# Patient Record
Sex: FEMALE | Race: BLACK OR AFRICAN AMERICAN | NOT HISPANIC OR LATINO | Employment: FULL TIME | URBAN - METROPOLITAN AREA
[De-identification: names, ages, dates, MRNs, and addresses within clinical notes are randomized per-mention and may not be internally consistent; named-entity substitution may affect disease eponyms.]

---

## 2023-03-07 LAB
ALANINE AMINOTRANSFERASE (SGPT) (U/L) IN SER/PLAS: 28 U/L (ref 7–45)
ALBUMIN (G/DL) IN SER/PLAS: 4.1 G/DL (ref 3.4–5)
ALBUMIN (MG/L) IN URINE: <7 MG/L
ALBUMIN/CREATININE (UG/MG) IN URINE: NORMAL UG/MG CRT (ref 0–30)
ALKALINE PHOSPHATASE (U/L) IN SER/PLAS: 73 U/L (ref 33–110)
ANION GAP IN SER/PLAS: 11 MMOL/L (ref 10–20)
ASPARTATE AMINOTRANSFERASE (SGOT) (U/L) IN SER/PLAS: 24 U/L (ref 9–39)
BILIRUBIN TOTAL (MG/DL) IN SER/PLAS: 0.4 MG/DL (ref 0–1.2)
CALCIDIOL (25 OH VITAMIN D3) (NG/ML) IN SER/PLAS: 33 NG/ML
CALCIUM (MG/DL) IN SER/PLAS: 9.7 MG/DL (ref 8.6–10.6)
CARBON DIOXIDE, TOTAL (MMOL/L) IN SER/PLAS: 30 MMOL/L (ref 21–32)
CHLORIDE (MMOL/L) IN SER/PLAS: 103 MMOL/L (ref 98–107)
CHOLESTEROL (MG/DL) IN SER/PLAS: 156 MG/DL (ref 0–199)
CHOLESTEROL IN HDL (MG/DL) IN SER/PLAS: 39.1 MG/DL
CHOLESTEROL/HDL RATIO: 4
CREATININE (MG/DL) IN SER/PLAS: 0.85 MG/DL (ref 0.5–1.05)
CREATININE (MG/DL) IN URINE: 126 MG/DL (ref 20–320)
CREATININE (MG/DL) IN URINE: 126 MG/DL (ref 20–320)
ERYTHROCYTE DISTRIBUTION WIDTH (RATIO) BY AUTOMATED COUNT: 16 % (ref 11.5–14.5)
ERYTHROCYTE MEAN CORPUSCULAR HEMOGLOBIN CONCENTRATION (G/DL) BY AUTOMATED: 31 G/DL (ref 32–36)
ERYTHROCYTE MEAN CORPUSCULAR VOLUME (FL) BY AUTOMATED COUNT: 79 FL (ref 80–100)
ERYTHROCYTES (10*6/UL) IN BLOOD BY AUTOMATED COUNT: 4.8 X10E12/L (ref 4–5.2)
ESTIMATED AVERAGE GLUCOSE FOR HBA1C: 180 MG/DL
GFR FEMALE: 84 ML/MIN/1.73M2
GLUCOSE (MG/DL) IN SER/PLAS: 153 MG/DL (ref 74–99)
HEMATOCRIT (%) IN BLOOD BY AUTOMATED COUNT: 38.1 % (ref 36–46)
HEMOGLOBIN (G/DL) IN BLOOD: 11.8 G/DL (ref 12–16)
HEMOGLOBIN A1C/HEMOGLOBIN TOTAL IN BLOOD: 7.9 %
IRON (UG/DL) IN SER/PLAS: 65 UG/DL (ref 35–150)
IRON BINDING CAPACITY (UG/DL) IN SER/PLAS: 326 UG/DL (ref 240–445)
IRON SATURATION (%) IN SER/PLAS: 20 % (ref 25–45)
LDL: 97 MG/DL (ref 0–99)
LEUKOCYTES (10*3/UL) IN BLOOD BY AUTOMATED COUNT: 12.3 X10E9/L (ref 4.4–11.3)
NRBC (PER 100 WBCS) BY AUTOMATED COUNT: 0 /100 WBC (ref 0–0)
PLATELETS (10*3/UL) IN BLOOD AUTOMATED COUNT: 424 X10E9/L (ref 150–450)
POTASSIUM (MMOL/L) IN SER/PLAS: 4.6 MMOL/L (ref 3.5–5.3)
PROTEIN (MG/DL) IN URINE: 7 MG/DL (ref 5–24)
PROTEIN TOTAL: 6.6 G/DL (ref 6.4–8.2)
PROTEIN/CREATININE (MG/MG) IN URINE: 0.06 MG/MG CREAT (ref 0–0.17)
SODIUM (MMOL/L) IN SER/PLAS: 139 MMOL/L (ref 136–145)
THYROTROPIN (MIU/L) IN SER/PLAS BY DETECTION LIMIT <= 0.05 MIU/L: 1.44 MIU/L (ref 0.44–3.98)
TRIGLYCERIDE (MG/DL) IN SER/PLAS: 99 MG/DL (ref 0–149)
UREA NITROGEN (MG/DL) IN SER/PLAS: 12 MG/DL (ref 6–23)
VLDL: 20 MG/DL (ref 0–40)

## 2023-03-16 DIAGNOSIS — Z00.00 HEALTHCARE MAINTENANCE: Primary | ICD-10-CM

## 2023-03-16 LAB
MUMPS IGG ANTIBODY: NEGATIVE
RUBEOLA IGG ANTIBODY: NEGATIVE
VARICELLA ZOSTER IGG: POSITIVE

## 2023-03-26 DIAGNOSIS — E11.9 TYPE 2 DIABETES MELLITUS WITHOUT COMPLICATIONS (MULTI): ICD-10-CM

## 2023-03-28 RX ORDER — LANCETS 30 GAUGE
EACH MISCELLANEOUS
Qty: 1 EACH | Refills: 0 | Status: SHIPPED | OUTPATIENT
Start: 2023-03-28

## 2023-05-28 DIAGNOSIS — T78.40XD ALLERGY, SUBSEQUENT ENCOUNTER: ICD-10-CM

## 2023-05-28 DIAGNOSIS — F32.9 MAJOR DEPRESSIVE DISORDER WITH CURRENT ACTIVE EPISODE, UNSPECIFIED DEPRESSION EPISODE SEVERITY, UNSPECIFIED WHETHER RECURRENT: ICD-10-CM

## 2023-05-30 RX ORDER — FLUTICASONE PROPIONATE 50 MCG
2 SPRAY, SUSPENSION (ML) NASAL DAILY
Qty: 16 G | Refills: 2 | Status: SHIPPED | OUTPATIENT
Start: 2023-05-30 | End: 2023-09-05

## 2023-05-30 RX ORDER — FLUTICASONE PROPIONATE 50 MCG
2 SPRAY, SUSPENSION (ML) NASAL DAILY
COMMUNITY
Start: 2020-06-30 | End: 2023-05-30 | Stop reason: SDUPTHER

## 2023-05-30 RX ORDER — CITALOPRAM 20 MG/1
TABLET, FILM COATED ORAL
Qty: 90 TABLET | Refills: 2 | Status: SHIPPED | OUTPATIENT
Start: 2023-05-30 | End: 2023-07-29

## 2023-05-30 NOTE — TELEPHONE ENCOUNTER
Rx Refill Request Telephone Encounter    Name:  Edithalison JOSE JUAN Terrell  :  [id number]  Medication Name:  Flonase 50mg   Specific Pharmacy location:  [facility]

## 2023-07-19 ENCOUNTER — HOSPITAL ENCOUNTER (OUTPATIENT)
Dept: DATA CONVERSION | Facility: HOSPITAL | Age: 48
End: 2023-07-19
Attending: STUDENT IN AN ORGANIZED HEALTH CARE EDUCATION/TRAINING PROGRAM | Admitting: STUDENT IN AN ORGANIZED HEALTH CARE EDUCATION/TRAINING PROGRAM
Payer: MEDICARE

## 2023-07-19 DIAGNOSIS — Z12.11 ENCOUNTER FOR SCREENING FOR MALIGNANT NEOPLASM OF COLON: ICD-10-CM

## 2023-07-19 DIAGNOSIS — K57.30 DIVERTICULOSIS OF LARGE INTESTINE WITHOUT PERFORATION OR ABSCESS WITHOUT BLEEDING: ICD-10-CM

## 2023-07-19 DIAGNOSIS — D12.3 BENIGN NEOPLASM OF TRANSVERSE COLON: ICD-10-CM

## 2023-07-19 LAB — POCT GLUCOSE: 103 MG/DL (ref 74–99)

## 2023-07-26 LAB
COMPLETE PATHOLOGY REPORT: NORMAL
CONVERTED CLINICAL DIAGNOSIS-HISTORY: NORMAL
CONVERTED FINAL DIAGNOSIS: NORMAL
CONVERTED FINAL REPORT PDF LINK TO COPY AND PASTE: NORMAL
CONVERTED GROSS DESCRIPTION: NORMAL

## 2023-09-05 DIAGNOSIS — T78.40XD ALLERGY, SUBSEQUENT ENCOUNTER: ICD-10-CM

## 2023-09-05 RX ORDER — FLUTICASONE PROPIONATE 50 MCG
2 SPRAY, SUSPENSION (ML) NASAL DAILY
Qty: 16 ML | Refills: 2 | Status: SHIPPED | OUTPATIENT
Start: 2023-09-05 | End: 2023-10-05

## 2023-09-26 DIAGNOSIS — C50.411 MALIGNANT NEOPLASM OF UPPER-OUTER QUADRANT OF RIGHT FEMALE BREAST, UNSPECIFIED ESTROGEN RECEPTOR STATUS (MULTI): Primary | ICD-10-CM

## 2023-09-26 RX ORDER — LIDOCAINE HYDROCHLORIDE 10 MG/ML
2 INJECTION, SOLUTION EPIDURAL; INFILTRATION; INTRACAUDAL; PERINEURAL ONCE
Status: CANCELLED | OUTPATIENT
Start: 2023-10-03

## 2023-09-26 RX ORDER — HEPARIN SODIUM,PORCINE/PF 10 UNIT/ML
50 SYRINGE (ML) INTRAVENOUS AS NEEDED
Status: CANCELLED | OUTPATIENT
Start: 2023-09-30

## 2023-09-26 RX ORDER — FAMOTIDINE 10 MG/ML
20 INJECTION INTRAVENOUS ONCE AS NEEDED
Status: CANCELLED | OUTPATIENT
Start: 2023-10-03

## 2023-09-26 RX ORDER — EPINEPHRINE 0.3 MG/.3ML
0.3 INJECTION SUBCUTANEOUS EVERY 5 MIN PRN
Status: CANCELLED | OUTPATIENT
Start: 2023-10-03

## 2023-09-26 RX ORDER — ALBUTEROL SULFATE 0.83 MG/ML
3 SOLUTION RESPIRATORY (INHALATION) AS NEEDED
Status: CANCELLED | OUTPATIENT
Start: 2023-10-03

## 2023-09-26 RX ORDER — DIPHENHYDRAMINE HYDROCHLORIDE 50 MG/ML
50 INJECTION INTRAMUSCULAR; INTRAVENOUS AS NEEDED
Status: CANCELLED | OUTPATIENT
Start: 2023-10-03

## 2023-09-26 RX ORDER — HEPARIN 100 UNIT/ML
500 SYRINGE INTRAVENOUS AS NEEDED
Status: CANCELLED | OUTPATIENT
Start: 2023-09-30

## 2023-09-28 PROBLEM — D05.11 DUCTAL CARCINOMA IN SITU (DCIS) OF RIGHT BREAST: Status: ACTIVE | Noted: 2023-09-28

## 2023-09-28 PROBLEM — R73.9 HYPERGLYCEMIA: Status: ACTIVE | Noted: 2023-09-28

## 2023-09-28 PROBLEM — N12 PYELONEPHRITIS: Status: ACTIVE | Noted: 2023-09-28

## 2023-09-28 PROBLEM — J30.2 SEASONAL ALLERGIES: Status: ACTIVE | Noted: 2023-09-28

## 2023-09-28 PROBLEM — R31.9 HEMATURIA: Status: ACTIVE | Noted: 2023-09-28

## 2023-09-28 PROBLEM — D22.5 MELANOCYTIC NEVI OF TRUNK: Status: ACTIVE | Noted: 2023-04-25

## 2023-09-28 PROBLEM — R35.0 FREQUENCY OF URINATION: Status: ACTIVE | Noted: 2023-09-28

## 2023-09-28 PROBLEM — E11.65 UNCONTROLLED TYPE 2 DIABETES MELLITUS WITH HYPERGLYCEMIA, WITHOUT LONG-TERM CURRENT USE OF INSULIN (MULTI): Status: ACTIVE | Noted: 2023-09-28

## 2023-09-28 PROBLEM — D22.70 MELANOCYTIC NEVI OF UNSPECIFIED LOWER LIMB, INCLUDING HIP: Status: ACTIVE | Noted: 2023-04-25

## 2023-09-28 PROBLEM — M72.2 PLANTAR FASCIITIS, LEFT: Status: ACTIVE | Noted: 2023-09-28

## 2023-09-28 PROBLEM — D22.39 MELANOCYTIC NEVI OF OTHER PARTS OF FACE: Status: ACTIVE | Noted: 2023-04-25

## 2023-09-28 PROBLEM — I10 HYPERTENSION: Status: ACTIVE | Noted: 2023-09-28

## 2023-09-28 PROBLEM — E66.01 OBESITY, CLASS III, BMI 40-49.9 (MORBID OBESITY) (MULTI): Status: ACTIVE | Noted: 2023-09-28

## 2023-09-28 PROBLEM — L85.3 XEROSIS OF SKIN: Status: ACTIVE | Noted: 2023-04-25

## 2023-09-28 PROBLEM — E66.813 OBESITY, CLASS III, BMI 40-49.9 (MORBID OBESITY): Status: ACTIVE | Noted: 2023-09-28

## 2023-09-28 PROBLEM — L57.9 SKIN CHANGES DUE TO CHRONIC EXPOSURE TO NONIONIZING RADIATION, UNSPECIFIED: Status: ACTIVE | Noted: 2023-04-25

## 2023-09-28 PROBLEM — R92.1 BREAST CALCIFICATIONS ON MAMMOGRAM: Status: ACTIVE | Noted: 2023-09-28

## 2023-09-28 PROBLEM — R60.0 PEDAL EDEMA: Status: ACTIVE | Noted: 2023-09-28

## 2023-09-28 PROBLEM — F41.8 DEPRESSION WITH ANXIETY: Status: ACTIVE | Noted: 2023-09-28

## 2023-09-28 PROBLEM — N90.89 VULVAR LESION: Status: ACTIVE | Noted: 2023-09-28

## 2023-09-28 PROBLEM — D22.4 MELANOCYTIC NEVI OF SCALP AND NECK: Status: ACTIVE | Noted: 2023-04-25

## 2023-09-28 PROBLEM — B35.1 ONYCHOMYCOSIS: Status: ACTIVE | Noted: 2023-09-28

## 2023-09-28 PROBLEM — E78.5 HLD (HYPERLIPIDEMIA): Status: ACTIVE | Noted: 2023-09-28

## 2023-09-28 PROBLEM — D22.60 MELANOCYTIC NEVI OF UNSPECIFIED UPPER LIMB, INCLUDING SHOULDER: Status: ACTIVE | Noted: 2023-04-25

## 2023-09-28 RX ORDER — ANASTROZOLE 1 MG/1
1 TABLET ORAL DAILY
COMMUNITY
End: 2024-02-26 | Stop reason: WASHOUT

## 2023-09-28 RX ORDER — PNV NO.95/FERROUS FUM/FOLIC AC 28MG-0.8MG
100 TABLET ORAL DAILY
COMMUNITY
End: 2024-02-26 | Stop reason: WASHOUT

## 2023-09-28 RX ORDER — ASCORBIC ACID 500 MG
500 TABLET ORAL DAILY
COMMUNITY
End: 2024-02-26 | Stop reason: WASHOUT

## 2023-09-28 RX ORDER — OFLOXACIN 3 MG/ML
5 SOLUTION AURICULAR (OTIC) 2 TIMES DAILY
COMMUNITY
Start: 2020-10-01

## 2023-09-28 RX ORDER — BLOOD-GLUCOSE SENSOR
EACH MISCELLANEOUS
COMMUNITY
Start: 2023-06-14

## 2023-09-28 RX ORDER — BIOTIN 1 MG
1 TABLET ORAL 2 TIMES DAILY
COMMUNITY
End: 2024-02-26 | Stop reason: WASHOUT

## 2023-09-28 RX ORDER — OXYCODONE HYDROCHLORIDE 5 MG/1
TABLET ORAL
COMMUNITY

## 2023-09-28 RX ORDER — METFORMIN HYDROCHLORIDE 500 MG/1
500 TABLET ORAL 2 TIMES DAILY
COMMUNITY
Start: 2020-10-01 | End: 2024-02-26 | Stop reason: WASHOUT

## 2023-09-28 RX ORDER — NAPROXEN 500 MG/1
500 TABLET ORAL 2 TIMES DAILY PRN
COMMUNITY
Start: 2021-12-08

## 2023-09-28 RX ORDER — KETOCONAZOLE 20 MG/ML
SHAMPOO, SUSPENSION TOPICAL
COMMUNITY
Start: 2023-04-25 | End: 2024-02-26 | Stop reason: WASHOUT

## 2023-09-28 RX ORDER — DICLOFENAC SODIUM 10 MG/G
GEL TOPICAL
COMMUNITY
Start: 2022-12-16

## 2023-09-28 RX ORDER — ZOLEDRONIC ACID 4 MG
KIT INTRAVENOUS
COMMUNITY
End: 2024-02-26 | Stop reason: WASHOUT

## 2023-09-28 RX ORDER — GABAPENTIN 300 MG/1
300 CAPSULE ORAL 3 TIMES DAILY
COMMUNITY
Start: 2022-08-10

## 2023-09-28 RX ORDER — HYDROCHLOROTHIAZIDE 25 MG/1
1 TABLET ORAL DAILY
COMMUNITY
Start: 2022-01-25

## 2023-09-28 RX ORDER — DULAGLUTIDE 1.5 MG/.5ML
INJECTION, SOLUTION SUBCUTANEOUS
COMMUNITY
Start: 2023-05-15 | End: 2024-02-26 | Stop reason: WASHOUT

## 2023-09-28 RX ORDER — ALBUTEROL SULFATE 90 UG/1
1-2 AEROSOL, METERED RESPIRATORY (INHALATION)
COMMUNITY
Start: 2022-10-03

## 2023-09-28 RX ORDER — NAPROXEN SODIUM 375 MG/1
2 TABLET, FILM COATED, EXTENDED RELEASE ORAL DAILY
COMMUNITY
Start: 2023-01-19

## 2023-10-03 ENCOUNTER — INFUSION (OUTPATIENT)
Dept: HEMATOLOGY/ONCOLOGY | Facility: CLINIC | Age: 48
End: 2023-10-03

## 2023-10-03 VITALS
BODY MASS INDEX: 42.87 KG/M2 | SYSTOLIC BLOOD PRESSURE: 133 MMHG | OXYGEN SATURATION: 100 % | HEART RATE: 76 BPM | DIASTOLIC BLOOD PRESSURE: 80 MMHG | TEMPERATURE: 97.3 F | RESPIRATION RATE: 18 BRPM | WEIGHT: 281.97 LBS

## 2023-10-03 DIAGNOSIS — C50.411 MALIGNANT NEOPLASM OF UPPER-OUTER QUADRANT OF RIGHT FEMALE BREAST, UNSPECIFIED ESTROGEN RECEPTOR STATUS (MULTI): ICD-10-CM

## 2023-10-03 PROCEDURE — 96402 CHEMO HORMON ANTINEOPL SQ/IM: CPT

## 2023-10-03 PROCEDURE — 2500000005 HC RX 250 GENERAL PHARMACY W/O HCPCS: Performed by: NURSE PRACTITIONER

## 2023-10-03 PROCEDURE — 2500000004 HC RX 250 GENERAL PHARMACY W/ HCPCS (ALT 636 FOR OP/ED): Mod: JZ | Performed by: NURSE PRACTITIONER

## 2023-10-03 RX ORDER — EPINEPHRINE 0.3 MG/.3ML
0.3 INJECTION SUBCUTANEOUS EVERY 5 MIN PRN
Status: DISCONTINUED | OUTPATIENT
Start: 2023-10-03 | End: 2023-10-03 | Stop reason: HOSPADM

## 2023-10-03 RX ORDER — ALBUTEROL SULFATE 0.83 MG/ML
3 SOLUTION RESPIRATORY (INHALATION) AS NEEDED
Status: CANCELLED | OUTPATIENT
Start: 2023-10-31

## 2023-10-03 RX ORDER — LIDOCAINE HYDROCHLORIDE 10 MG/ML
2 INJECTION, SOLUTION EPIDURAL; INFILTRATION; INTRACAUDAL; PERINEURAL ONCE
Status: CANCELLED | OUTPATIENT
Start: 2023-10-31

## 2023-10-03 RX ORDER — DIPHENHYDRAMINE HYDROCHLORIDE 50 MG/ML
50 INJECTION INTRAMUSCULAR; INTRAVENOUS AS NEEDED
Status: CANCELLED | OUTPATIENT
Start: 2023-10-31

## 2023-10-03 RX ORDER — LIDOCAINE HYDROCHLORIDE 10 MG/ML
2 INJECTION, SOLUTION EPIDURAL; INFILTRATION; INTRACAUDAL; PERINEURAL ONCE
Status: COMPLETED | OUTPATIENT
Start: 2023-10-03 | End: 2023-10-03

## 2023-10-03 RX ORDER — FAMOTIDINE 10 MG/ML
20 INJECTION INTRAVENOUS ONCE AS NEEDED
Status: DISCONTINUED | OUTPATIENT
Start: 2023-10-03 | End: 2023-10-03 | Stop reason: HOSPADM

## 2023-10-03 RX ORDER — ALBUTEROL SULFATE 0.83 MG/ML
3 SOLUTION RESPIRATORY (INHALATION) AS NEEDED
Status: DISCONTINUED | OUTPATIENT
Start: 2023-10-03 | End: 2023-10-03 | Stop reason: HOSPADM

## 2023-10-03 RX ORDER — FAMOTIDINE 10 MG/ML
20 INJECTION INTRAVENOUS ONCE AS NEEDED
Status: CANCELLED | OUTPATIENT
Start: 2023-10-31

## 2023-10-03 RX ORDER — EPINEPHRINE 0.3 MG/.3ML
0.3 INJECTION SUBCUTANEOUS EVERY 5 MIN PRN
Status: CANCELLED | OUTPATIENT
Start: 2023-10-31

## 2023-10-03 RX ORDER — DIPHENHYDRAMINE HYDROCHLORIDE 50 MG/ML
50 INJECTION INTRAMUSCULAR; INTRAVENOUS AS NEEDED
Status: DISCONTINUED | OUTPATIENT
Start: 2023-10-03 | End: 2023-10-03 | Stop reason: HOSPADM

## 2023-10-03 RX ADMIN — GOSERELIN ACETATE 3.6 MG: 3.6 IMPLANT SUBCUTANEOUS at 09:34

## 2023-10-03 RX ADMIN — LIDOCAINE HYDROCHLORIDE 20 MG: 10 INJECTION, SOLUTION EPIDURAL; INFILTRATION; INTRACAUDAL; PERINEURAL at 09:29

## 2023-10-03 ASSESSMENT — PAIN SCALES - GENERAL: PAINLEVEL: 0-NO PAIN

## 2023-10-03 ASSESSMENT — ENCOUNTER SYMPTOMS
LOSS OF SENSATION IN FEET: 0
DEPRESSION: 0
OCCASIONAL FEELINGS OF UNSTEADINESS: 0

## 2023-10-03 NOTE — PROGRESS NOTES
Patient here for zoladex injection administered after dwell of lidocain injection in left abdomen - patient tolerated well. Bandaid applied

## 2023-10-31 ENCOUNTER — TELEPHONE (OUTPATIENT)
Dept: HEMATOLOGY/ONCOLOGY | Facility: CLINIC | Age: 48
End: 2023-10-31

## 2023-11-03 ENCOUNTER — INFUSION (OUTPATIENT)
Dept: HEMATOLOGY/ONCOLOGY | Facility: CLINIC | Age: 48
End: 2023-11-03
Payer: MEDICARE

## 2023-11-03 VITALS
OXYGEN SATURATION: 98 % | TEMPERATURE: 98.1 F | RESPIRATION RATE: 18 BRPM | WEIGHT: 282.6 LBS | DIASTOLIC BLOOD PRESSURE: 87 MMHG | HEART RATE: 101 BPM | BODY MASS INDEX: 42.97 KG/M2 | SYSTOLIC BLOOD PRESSURE: 140 MMHG

## 2023-11-03 DIAGNOSIS — C50.411 MALIGNANT NEOPLASM OF UPPER-OUTER QUADRANT OF RIGHT FEMALE BREAST, UNSPECIFIED ESTROGEN RECEPTOR STATUS (MULTI): ICD-10-CM

## 2023-11-03 PROCEDURE — 96402 CHEMO HORMON ANTINEOPL SQ/IM: CPT

## 2023-11-03 PROCEDURE — 96372 THER/PROPH/DIAG INJ SC/IM: CPT

## 2023-11-03 PROCEDURE — 2500000004 HC RX 250 GENERAL PHARMACY W/ HCPCS (ALT 636 FOR OP/ED): Mod: JZ | Performed by: NURSE PRACTITIONER

## 2023-11-03 PROCEDURE — 2500000005 HC RX 250 GENERAL PHARMACY W/O HCPCS: Performed by: NURSE PRACTITIONER

## 2023-11-03 RX ORDER — LIDOCAINE HYDROCHLORIDE 10 MG/ML
2 INJECTION, SOLUTION EPIDURAL; INFILTRATION; INTRACAUDAL; PERINEURAL ONCE
Status: CANCELLED | OUTPATIENT
Start: 2023-11-28

## 2023-11-03 RX ORDER — LIDOCAINE HYDROCHLORIDE 10 MG/ML
2 INJECTION, SOLUTION EPIDURAL; INFILTRATION; INTRACAUDAL; PERINEURAL ONCE
Status: COMPLETED | OUTPATIENT
Start: 2023-11-03 | End: 2023-11-03

## 2023-11-03 RX ORDER — FAMOTIDINE 10 MG/ML
20 INJECTION INTRAVENOUS ONCE AS NEEDED
Status: CANCELLED | OUTPATIENT
Start: 2023-11-28

## 2023-11-03 RX ORDER — ALBUTEROL SULFATE 0.83 MG/ML
3 SOLUTION RESPIRATORY (INHALATION) AS NEEDED
Status: CANCELLED | OUTPATIENT
Start: 2023-11-28

## 2023-11-03 RX ORDER — DIPHENHYDRAMINE HYDROCHLORIDE 50 MG/ML
50 INJECTION INTRAMUSCULAR; INTRAVENOUS AS NEEDED
Status: CANCELLED | OUTPATIENT
Start: 2023-11-28

## 2023-11-03 RX ORDER — EPINEPHRINE 0.3 MG/.3ML
0.3 INJECTION SUBCUTANEOUS EVERY 5 MIN PRN
Status: CANCELLED | OUTPATIENT
Start: 2023-11-28

## 2023-11-03 RX ADMIN — GOSERELIN ACETATE 3.6 MG: 3.6 IMPLANT SUBCUTANEOUS at 08:38

## 2023-11-03 RX ADMIN — LIDOCAINE HYDROCHLORIDE 20 MG: 10 INJECTION, SOLUTION EPIDURAL; INFILTRATION; INTRACAUDAL; PERINEURAL at 08:37

## 2023-11-03 ASSESSMENT — PAIN SCALES - GENERAL: PAINLEVEL: 0-NO PAIN

## 2023-11-09 ENCOUNTER — OFFICE VISIT (OUTPATIENT)
Dept: HEMATOLOGY/ONCOLOGY | Facility: CLINIC | Age: 48
End: 2023-11-09
Payer: MEDICARE

## 2023-11-09 VITALS
SYSTOLIC BLOOD PRESSURE: 142 MMHG | BODY MASS INDEX: 41.52 KG/M2 | TEMPERATURE: 98.1 F | HEART RATE: 87 BPM | WEIGHT: 280.31 LBS | OXYGEN SATURATION: 97 % | DIASTOLIC BLOOD PRESSURE: 86 MMHG | HEIGHT: 69 IN

## 2023-11-09 DIAGNOSIS — Z85.3 HISTORY OF RIGHT BREAST CANCER: ICD-10-CM

## 2023-11-09 DIAGNOSIS — C50.411 MALIGNANT NEOPLASM OF UPPER-OUTER QUADRANT OF RIGHT FEMALE BREAST, UNSPECIFIED ESTROGEN RECEPTOR STATUS (MULTI): Primary | ICD-10-CM

## 2023-11-09 PROCEDURE — 3079F DIAST BP 80-89 MM HG: CPT | Performed by: NURSE PRACTITIONER

## 2023-11-09 PROCEDURE — 1036F TOBACCO NON-USER: CPT | Performed by: NURSE PRACTITIONER

## 2023-11-09 PROCEDURE — 99213 OFFICE O/P EST LOW 20 MIN: CPT | Performed by: NURSE PRACTITIONER

## 2023-11-09 PROCEDURE — 3051F HG A1C>EQUAL 7.0%<8.0%: CPT | Performed by: NURSE PRACTITIONER

## 2023-11-09 PROCEDURE — 99203 OFFICE O/P NEW LOW 30 MIN: CPT | Performed by: NURSE PRACTITIONER

## 2023-11-09 PROCEDURE — 3077F SYST BP >= 140 MM HG: CPT | Performed by: NURSE PRACTITIONER

## 2023-11-09 ASSESSMENT — PAIN SCALES - GENERAL: PAINLEVEL: 0-NO PAIN

## 2023-11-09 NOTE — PROGRESS NOTES
Oncology Follow-Up    Floresita Terrell  [id number]       AJCC Edition: 8th (AJCC), Diagnosis Date: Oct 2018, IA, pT1a pN0 cM0 G3   Oncology History    No history exists.   1. Screening mammogram 8/21/18 showed calcifications right breast confirmed on diagnostic imaging 9/5/18.   2. Right breast calcification stereotactic biopsies 9/18/18 showed high grade DCIS  with necrosis in 1 of 2 specimens, ER 40% HI <1%.  3. Right breast excision 10/12/18 x 2 showed DCIS 1.4 cm ER 0% HI 0%  and invasive ductal carcinoma 0.4 cm arising in DCIS with EIC. Invasive carcinoma was ER  40% HI 0% HER2 3+. Given the low risk of recurrence, chemotherapy with Herceptin was not given.   4. She underwent right mastectomy and immediate reconstruction with prepectoral tissue expander  12/7/18.  She tolerated this well, however, developed a hematoma of the right mastectomy pocket that evening requiring operative drainage and re-closure. Pathology showed no residual invasive ductal carcinoma or DCIS and 0/5 SLN/ALN.   5. Goserelin initiated 1/2019 and anastrozole  for five years added 2/12/19 and zoledronic acid every 6 months for 3 years started 3/13/2019 and completed August 2021.  6. Genetic testing on 2/11/19 25 gene panel negative.  7. On 3/11/20: Second stage right breast reconstruction with right latissimus dorsi myocutaneous flap, matching left breast reduction      Subjective    Floresita presents for her yearly follow up. She feels well and reports no new health issues. She is exercising and doing monthly Breast self exams. She rates her energy level as 8/10 and the only distress she has is medical insurance. She is now working 2 jobs to help pay for her medical bills. Floresita continues on anastrozole and Goserelin with good tolerance other then joint pain. She recently had a colonoscopy with a very small polyp. Floresita denies any unusual headaches, balance issues, depression, cough, shortness of breath, problems swallowing, changes  in chest/breast area, abdominal pain, bone or muscle pain, (other then mentioned above), vaginal bleeding, rectal bleeding, blood in the urine, vaginal dryness, swelling arms or legs, new or unusual skin moles or lesions.         Objective      Vitals:    11/09/23 1116   BP: 142/86   Pulse: 87   Temp: 36.7 °C (98.1 °F)   SpO2: 97%        Constitutional: Well developed, alert/oriented x3, no distress, cooperative   Eyes: clear sclera   ENMT: mucous membranes moist, no apparent lesions   Head/Neck: Neck supple, no bruits   Respiratory/Thorax: Patent airways, normal breath sounds with good chest expansion   Cardiovascular: Regular rate and rhythm, no murmurs, 2+ equal pulses of the extremities,   Gastrointestinal: Nondistended, soft, non-tender, no masses palpable, no organomegaly   Musculoskeletal: ROM intact, no joint swelling, normal strength   Extremities: normal extremities, no edema, cyanosis, contusions or wounds   Neurological: alert and oriented x3,  normal strength   Breast:     Lymphatic: No significant lymphadenopathy   Psychological: Appropriate mood and behavior   Skin: Warm and dry, no lesions, no rashes      Physical Exam  Chest:          Comments: Right mastectomy with latissimus dorsi myocutaneous flap reconstruction is without masses, nodules, skin changes, discharge. Left breast + for reduction mammoplasty with well healed incisions; no masses, nodules, skin changes, discharge. Right breast with small black head (Ralishia pointed it out during exam)         Lab Results   Component Value Date    WBC 15.4 (H) 07/28/2023    HGB 11.8 (L) 07/28/2023    HCT 36.8 07/28/2023    MCV 77 (L) 07/28/2023     07/28/2023       Chemistry    Lab Results   Component Value Date/Time     07/28/2023 0258    K 3.6 07/28/2023 0258     07/28/2023 0258    CO2 29 07/28/2023 0258    BUN 14 07/28/2023 0258    CREATININE 0.92 07/28/2023 0258    Lab Results   Component Value Date/Time    CALCIUM 9.4 07/28/2023  0258    ALKPHOS 72 07/28/2023 0258    AST 17 07/28/2023 0258    ALT 14 07/28/2023 0258    BILITOT 0.3 07/28/2023 0258              Imaging:    MRN: [id number]  Patient Name: LINH JAIMES     STUDY:  DIGITAL DIAG MAMM LEFT W/TONA UNILAT; BREAST ULTRASOUND;  4/5/2023  10:26 am; 4/5/2023 10:49 am     ACCESSION NUMBER(S):  [id number]; [id number]     ORDERING CLINICIAN:  TRINIDAD DIAZ     INDICATION:  The patient was recalled from recent screening mammogram 03/07/2023  for evaluation of a lateral left breast asymmetry. History of right  mastectomy and left breast reduction. Patient reports recent  vaccination to the left upper extremity..     COMPARISON:  Left mammogram 03/07/2023 with comparison 03/04/2022, 03/03/2021,  08/31/2020     FINDINGS:  MAMMOGRAPHY: 2D and tomosynthesis images were reviewed at 1 mm slice  thickness.     There are areas of scattered fibroglandular tissue.  There is a  persisting oval circumscribed equal density asymmetry in the lateral  aspect of the left breast at mid depth, localizing centrally on  tomosynthesis. This is not seen in the orthogonal projection. There  is stable post reduction redistribution of glandular tissue within  the remainder of the left breast.     ULTRASOUND:  Sonographic scanning of the entire lateral left breast  confirms an intramammary lymph node at the 3 o'clock position of the  left breast 11 cm from the nipple measuring 1.1 x 0.7 x 0.8 cm. There  is a uniform 3 mm hypoechoic cortex, a maintain fatty hilum with  Doppler blood flow, and soft elastography features. There is a 2nd  benign-appearing lymph node at 3:00, 11 cm from the nipple measuring  1.2 by 0.6 x 1.3 cm with a uniform 2 mm cortex. Either lymph node  could correlate with the asymmetry visualized on mammogram.     Incidentally noted is a benign simple cyst at the 3 o'clock position  subareolar region measuring 9 mm.     IMPRESSION:  2 benign-appearing intramammary lymph nodes, 3 o'clock position  left  breast, 11 cm from the nipple with 1 lymph node demonstrating  borderline uniform cortical prominence. This may relate to recent  vaccination. Targeted ultrasound in 3-4 months recommended to  document stability or interval improvement.     BI-RADS CATEGORY:     Category: 3 - Probably Benign.  Recommendation: 3-4 month ultrasound follow-up of lymph node 1    INDICATION:  hx breast cancer, left axillary lymphadenopathy, recommend 3-4 month  u/s.  C50.911: Malignant neoplasm of right breast. Recent  mammographic and sonographic imaging of the left breast 04/05/2023  demonstrated 2 left intramammary lymph nodes with prominent cortices.  The patient had a recent vaccination to the left upper extremity.  This is a follow-up study.     COMPARISON:  Left mammogram and ultrasound 04/05/2023. Left breast and axillary  ultrasound 03/03/2021, 12/01/2020, 08/31/2020.     FINDINGS:  The findings are stable. There are 2 intramammary lymph nodes within  the left breast at 3 o'clock position cm from the nipple maintaining  fatty radha with uniform thickened cortices. Additional scanning of  the left axilla demonstrates 3 axillary lymph nodes with uniform  thickened cortices. When accounting for differences in technique, the  sonographic findings are unchanged dating back to 08/31/2020.     IMPRESSION:  Stable prominent left intramammary and left axillary lymph nodes,  unchanged dating back to 08/31/2020. These are deemed benign.     Annual left mammogram in April 2024 recommended.     BI-RADS CATEGORY:     Category: 2 - Benign.  Recommendation: Routine annual mammography        Assessment/Plan    Floresita is a 49 yo woman with a hx of T1s right breast cancer diagnosed in September 2018. She is s/p mastectomy with LDF reconstruction, Zometa, goserelin and anastrozole. There is no evidence of recurrent disease on today's exam.   Plan:  Exam is negative.  Continue anastrozole and goserelin through February and then discontinue,  will have completed 5 years of therapy.   Referred to Shanique Ansari in social work for possible resources for health care or deductible assistance.  See Dr. Medina yearly.  Encouraged monthly breast self exams, plant based diet, keep alcohol <3 drinks/week, exercise at least 2.5 hours/week.   We reviewed signs/symptoms of recurrence including new masses, new pigmented lesion, tugging or pulling of the skin, nipple discharge, rash in or around the chest area, or any new finding that doesn't resolve within a 2-3 weeks  All of Floresita's questions/concerns were addressed.   Over 25 minutes of time was spent with this patient with >50% of the time with education, counseling, and coordination of care.   Mammogram due in April, return to clinic in one year.   There are no diagnoses linked to this encounter.    Colonoscopy, gynecology, and vacccines up to date.    Sarah Hernandez, APRN-CNP

## 2023-11-09 NOTE — PATIENT INSTRUCTIONS
1. Exercise 2.5 hours per week; bone strengthening, cardio-vascular, resistance training.  2. Please do self breast exams monthly.  3. Keep alcohol under 3 drinks per week.  4. Sun safety - limit sun exposure from 11a-2p when its at its hottest, apply 15-30 sun block and re-apply every 1-2 hours if perspiring or swimming.  5. Eat a plant based diet, add in oily fishes such as mackerel, tuna, and salmon.  6. Get in at least 1,000 mg of calcium per day through diet or supplement for bone strength. Examples of foods higher in calcium are milk, yogurt, fruited yogurt, oranges, fortified orange juice, almonds, almond milk, broccoli, spinach, bok toni, mustard greens, puddings, custards, ice cream, fortified cereals, bars, and crackers.   7. Continue anastrozole and goserelin through February 2024 and then discontinue. Congratulations on completing therapy!  8. Please call the office if any new mass or rash in or around breast, or any uncontrolled symptoms that last over 2-3 weeks at [phone].  9. Your mammogram is due after April 5th, 2024. Please schedule that at your convenience.   10. It was nice seeing you today, Floresita. I will see you back in one year. Please call with any concerns.  Have a Happy, Healthy, Holiday Season!   Thank you for choosing [facility] for your care.

## 2023-12-01 ENCOUNTER — APPOINTMENT (OUTPATIENT)
Dept: HEMATOLOGY/ONCOLOGY | Facility: CLINIC | Age: 48
End: 2023-12-01
Payer: MEDICARE

## 2023-12-07 DIAGNOSIS — C50.411 MALIGNANT NEOPLASM OF UPPER-OUTER QUADRANT OF RIGHT FEMALE BREAST, UNSPECIFIED ESTROGEN RECEPTOR STATUS (MULTI): Primary | ICD-10-CM

## 2023-12-08 ENCOUNTER — INFUSION (OUTPATIENT)
Dept: HEMATOLOGY/ONCOLOGY | Facility: CLINIC | Age: 48
End: 2023-12-08

## 2023-12-08 VITALS
RESPIRATION RATE: 18 BRPM | DIASTOLIC BLOOD PRESSURE: 80 MMHG | SYSTOLIC BLOOD PRESSURE: 132 MMHG | HEART RATE: 80 BPM | TEMPERATURE: 97.3 F | BODY MASS INDEX: 43.27 KG/M2 | WEIGHT: 288.8 LBS

## 2023-12-08 DIAGNOSIS — C50.411 MALIGNANT NEOPLASM OF UPPER-OUTER QUADRANT OF RIGHT FEMALE BREAST, UNSPECIFIED ESTROGEN RECEPTOR STATUS (MULTI): ICD-10-CM

## 2023-12-08 PROCEDURE — 2500000004 HC RX 250 GENERAL PHARMACY W/ HCPCS (ALT 636 FOR OP/ED): Mod: JZ | Performed by: NURSE PRACTITIONER

## 2023-12-08 PROCEDURE — 2500000005 HC RX 250 GENERAL PHARMACY W/O HCPCS: Performed by: NURSE PRACTITIONER

## 2023-12-08 PROCEDURE — 96402 CHEMO HORMON ANTINEOPL SQ/IM: CPT

## 2023-12-08 RX ORDER — EPINEPHRINE 0.3 MG/.3ML
0.3 INJECTION SUBCUTANEOUS EVERY 5 MIN PRN
Status: CANCELLED | OUTPATIENT
Start: 2023-12-26

## 2023-12-08 RX ORDER — LIDOCAINE HYDROCHLORIDE 10 MG/ML
2 INJECTION, SOLUTION EPIDURAL; INFILTRATION; INTRACAUDAL; PERINEURAL ONCE
Status: COMPLETED | OUTPATIENT
Start: 2023-12-08 | End: 2023-12-08

## 2023-12-08 RX ORDER — DIPHENHYDRAMINE HYDROCHLORIDE 50 MG/ML
50 INJECTION INTRAMUSCULAR; INTRAVENOUS AS NEEDED
Status: DISCONTINUED | OUTPATIENT
Start: 2023-12-08 | End: 2023-12-08 | Stop reason: HOSPADM

## 2023-12-08 RX ORDER — EPINEPHRINE 0.3 MG/.3ML
0.3 INJECTION SUBCUTANEOUS EVERY 5 MIN PRN
Status: DISCONTINUED | OUTPATIENT
Start: 2023-12-08 | End: 2023-12-08 | Stop reason: HOSPADM

## 2023-12-08 RX ORDER — DIPHENHYDRAMINE HYDROCHLORIDE 50 MG/ML
50 INJECTION INTRAMUSCULAR; INTRAVENOUS AS NEEDED
Status: CANCELLED | OUTPATIENT
Start: 2023-12-26

## 2023-12-08 RX ORDER — ALBUTEROL SULFATE 0.83 MG/ML
3 SOLUTION RESPIRATORY (INHALATION) AS NEEDED
Status: CANCELLED | OUTPATIENT
Start: 2023-12-26

## 2023-12-08 RX ORDER — FAMOTIDINE 10 MG/ML
20 INJECTION INTRAVENOUS ONCE AS NEEDED
Status: DISCONTINUED | OUTPATIENT
Start: 2023-12-08 | End: 2023-12-08 | Stop reason: HOSPADM

## 2023-12-08 RX ORDER — FAMOTIDINE 10 MG/ML
20 INJECTION INTRAVENOUS ONCE AS NEEDED
Status: CANCELLED | OUTPATIENT
Start: 2023-12-26

## 2023-12-08 RX ORDER — LIDOCAINE HYDROCHLORIDE 10 MG/ML
2 INJECTION, SOLUTION EPIDURAL; INFILTRATION; INTRACAUDAL; PERINEURAL ONCE
Status: CANCELLED | OUTPATIENT
Start: 2023-12-26

## 2023-12-08 RX ORDER — ALBUTEROL SULFATE 0.83 MG/ML
3 SOLUTION RESPIRATORY (INHALATION) AS NEEDED
Status: DISCONTINUED | OUTPATIENT
Start: 2023-12-08 | End: 2023-12-08 | Stop reason: HOSPADM

## 2023-12-08 RX ADMIN — GOSERELIN ACETATE 3.6 MG: 3.6 IMPLANT SUBCUTANEOUS at 10:59

## 2023-12-08 RX ADMIN — LIDOCAINE HYDROCHLORIDE 20 MG: 10 INJECTION, SOLUTION EPIDURAL; INFILTRATION; INTRACAUDAL; PERINEURAL at 10:55

## 2023-12-08 ASSESSMENT — PAIN SCALES - GENERAL: PAINLEVEL: 0-NO PAIN

## 2023-12-26 ENCOUNTER — TELEPHONE (OUTPATIENT)
Dept: HEMATOLOGY/ONCOLOGY | Facility: CLINIC | Age: 48
End: 2023-12-26

## 2023-12-26 ENCOUNTER — APPOINTMENT (OUTPATIENT)
Dept: HEMATOLOGY/ONCOLOGY | Facility: CLINIC | Age: 48
End: 2023-12-26
Payer: MEDICARE

## 2023-12-26 NOTE — TELEPHONE ENCOUNTER
John left that it is too early for Goserelin shot per Lexy NIEVES and to reschedule after Jan. 1st 2024.

## 2024-01-03 ENCOUNTER — TELEPHONE (OUTPATIENT)
Dept: HEMATOLOGY/ONCOLOGY | Facility: HOSPITAL | Age: 49
End: 2024-01-03
Payer: MEDICARE

## 2024-01-05 ENCOUNTER — INFUSION (OUTPATIENT)
Dept: HEMATOLOGY/ONCOLOGY | Facility: CLINIC | Age: 49
End: 2024-01-05
Payer: MEDICARE

## 2024-01-05 VITALS
SYSTOLIC BLOOD PRESSURE: 137 MMHG | HEART RATE: 87 BPM | RESPIRATION RATE: 18 BRPM | BODY MASS INDEX: 42.55 KG/M2 | OXYGEN SATURATION: 96 % | WEIGHT: 284 LBS | DIASTOLIC BLOOD PRESSURE: 87 MMHG | TEMPERATURE: 97.5 F

## 2024-01-05 DIAGNOSIS — C50.411 MALIGNANT NEOPLASM OF UPPER-OUTER QUADRANT OF RIGHT FEMALE BREAST, UNSPECIFIED ESTROGEN RECEPTOR STATUS (MULTI): ICD-10-CM

## 2024-01-05 PROCEDURE — 96372 THER/PROPH/DIAG INJ SC/IM: CPT

## 2024-01-05 PROCEDURE — 96402 CHEMO HORMON ANTINEOPL SQ/IM: CPT

## 2024-01-05 PROCEDURE — 2500000004 HC RX 250 GENERAL PHARMACY W/ HCPCS (ALT 636 FOR OP/ED): Mod: JZ | Performed by: NURSE PRACTITIONER

## 2024-01-05 PROCEDURE — 2500000005 HC RX 250 GENERAL PHARMACY W/O HCPCS: Performed by: NURSE PRACTITIONER

## 2024-01-05 RX ORDER — FAMOTIDINE 10 MG/ML
20 INJECTION INTRAVENOUS ONCE AS NEEDED
Status: CANCELLED | OUTPATIENT
Start: 2024-02-02

## 2024-01-05 RX ORDER — LIDOCAINE HYDROCHLORIDE 10 MG/ML
2 INJECTION, SOLUTION EPIDURAL; INFILTRATION; INTRACAUDAL; PERINEURAL ONCE
Status: COMPLETED | OUTPATIENT
Start: 2024-01-05 | End: 2024-01-05

## 2024-01-05 RX ORDER — EPINEPHRINE 0.3 MG/.3ML
0.3 INJECTION SUBCUTANEOUS EVERY 5 MIN PRN
Status: DISCONTINUED | OUTPATIENT
Start: 2024-01-05 | End: 2024-01-05 | Stop reason: HOSPADM

## 2024-01-05 RX ORDER — EPINEPHRINE 0.3 MG/.3ML
0.3 INJECTION SUBCUTANEOUS EVERY 5 MIN PRN
Status: CANCELLED | OUTPATIENT
Start: 2024-02-02

## 2024-01-05 RX ORDER — LIDOCAINE HYDROCHLORIDE 10 MG/ML
2 INJECTION, SOLUTION EPIDURAL; INFILTRATION; INTRACAUDAL; PERINEURAL ONCE
Status: CANCELLED | OUTPATIENT
Start: 2024-02-02

## 2024-01-05 RX ORDER — DIPHENHYDRAMINE HYDROCHLORIDE 50 MG/ML
50 INJECTION INTRAMUSCULAR; INTRAVENOUS AS NEEDED
Status: CANCELLED | OUTPATIENT
Start: 2024-02-02

## 2024-01-05 RX ORDER — ALBUTEROL SULFATE 0.83 MG/ML
3 SOLUTION RESPIRATORY (INHALATION) AS NEEDED
Status: DISCONTINUED | OUTPATIENT
Start: 2024-01-05 | End: 2024-01-05 | Stop reason: HOSPADM

## 2024-01-05 RX ORDER — FAMOTIDINE 10 MG/ML
20 INJECTION INTRAVENOUS ONCE AS NEEDED
Status: DISCONTINUED | OUTPATIENT
Start: 2024-01-05 | End: 2024-01-05 | Stop reason: HOSPADM

## 2024-01-05 RX ORDER — ALBUTEROL SULFATE 0.83 MG/ML
3 SOLUTION RESPIRATORY (INHALATION) AS NEEDED
Status: CANCELLED | OUTPATIENT
Start: 2024-02-02

## 2024-01-05 RX ORDER — DIPHENHYDRAMINE HYDROCHLORIDE 50 MG/ML
50 INJECTION INTRAMUSCULAR; INTRAVENOUS AS NEEDED
Status: DISCONTINUED | OUTPATIENT
Start: 2024-01-05 | End: 2024-01-05 | Stop reason: HOSPADM

## 2024-01-05 RX ADMIN — LIDOCAINE HYDROCHLORIDE 20 MG: 10 INJECTION, SOLUTION EPIDURAL; INFILTRATION; INTRACAUDAL; PERINEURAL at 11:43

## 2024-01-05 RX ADMIN — GOSERELIN ACETATE 3.6 MG: 3.6 IMPLANT SUBCUTANEOUS at 11:44

## 2024-01-05 ASSESSMENT — PAIN SCALES - GENERAL: PAINLEVEL: 0-NO PAIN

## 2024-01-23 ENCOUNTER — APPOINTMENT (OUTPATIENT)
Dept: HEMATOLOGY/ONCOLOGY | Facility: CLINIC | Age: 49
End: 2024-01-23
Payer: MEDICARE

## 2024-01-24 ENCOUNTER — TELEPHONE (OUTPATIENT)
Dept: ENDOCRINOLOGY | Facility: CLINIC | Age: 49
End: 2024-01-24

## 2024-01-24 ENCOUNTER — TELEPHONE (OUTPATIENT)
Dept: ADMISSION | Facility: HOSPITAL | Age: 49
End: 2024-01-24
Payer: MEDICARE

## 2024-01-24 NOTE — TELEPHONE ENCOUNTER
Changed insurance - [employer] sensors now need pa    Prior Auth for [employer] pending determination  Submitted on 1/24/24 via cmm    KEY: [id number]

## 2024-01-24 NOTE — TELEPHONE ENCOUNTER
Patient called regarding schedule, States she was anticipating last treatment injection of Goserelin to be completed in February.     Patient is also scheduled in march 2024    Providers note from 11/9/2023  goserelin through February and then discontinue    Message sent to provider and Rn partner to confirm.

## 2024-02-13 ENCOUNTER — INFUSION (OUTPATIENT)
Dept: HEMATOLOGY/ONCOLOGY | Facility: CLINIC | Age: 49
End: 2024-02-13
Payer: MEDICARE

## 2024-02-13 VITALS
RESPIRATION RATE: 18 BRPM | DIASTOLIC BLOOD PRESSURE: 86 MMHG | TEMPERATURE: 96.1 F | HEART RATE: 78 BPM | OXYGEN SATURATION: 96 % | SYSTOLIC BLOOD PRESSURE: 138 MMHG

## 2024-02-13 DIAGNOSIS — C50.411 MALIGNANT NEOPLASM OF UPPER-OUTER QUADRANT OF RIGHT FEMALE BREAST, UNSPECIFIED ESTROGEN RECEPTOR STATUS (MULTI): ICD-10-CM

## 2024-02-13 PROCEDURE — 2500000005 HC RX 250 GENERAL PHARMACY W/O HCPCS: Performed by: NURSE PRACTITIONER

## 2024-02-13 PROCEDURE — 2500000004 HC RX 250 GENERAL PHARMACY W/ HCPCS (ALT 636 FOR OP/ED): Mod: JZ | Performed by: NURSE PRACTITIONER

## 2024-02-13 PROCEDURE — 96402 CHEMO HORMON ANTINEOPL SQ/IM: CPT

## 2024-02-13 PROCEDURE — 96372 THER/PROPH/DIAG INJ SC/IM: CPT

## 2024-02-13 RX ORDER — LIDOCAINE HYDROCHLORIDE 10 MG/ML
2 INJECTION, SOLUTION EPIDURAL; INFILTRATION; INTRACAUDAL; PERINEURAL ONCE
Status: CANCELLED | OUTPATIENT
Start: 2024-03-01

## 2024-02-13 RX ORDER — EPINEPHRINE 0.3 MG/.3ML
0.3 INJECTION SUBCUTANEOUS EVERY 5 MIN PRN
Status: CANCELLED | OUTPATIENT
Start: 2024-03-01

## 2024-02-13 RX ORDER — EPINEPHRINE 0.3 MG/.3ML
0.3 INJECTION SUBCUTANEOUS EVERY 5 MIN PRN
Status: DISCONTINUED | OUTPATIENT
Start: 2024-02-13 | End: 2024-02-13 | Stop reason: HOSPADM

## 2024-02-13 RX ORDER — DIPHENHYDRAMINE HYDROCHLORIDE 50 MG/ML
50 INJECTION INTRAMUSCULAR; INTRAVENOUS AS NEEDED
Status: CANCELLED | OUTPATIENT
Start: 2024-03-01

## 2024-02-13 RX ORDER — LIDOCAINE HYDROCHLORIDE 10 MG/ML
2 INJECTION, SOLUTION EPIDURAL; INFILTRATION; INTRACAUDAL; PERINEURAL ONCE
Status: COMPLETED | OUTPATIENT
Start: 2024-02-13 | End: 2024-02-13

## 2024-02-13 RX ORDER — ALBUTEROL SULFATE 0.83 MG/ML
3 SOLUTION RESPIRATORY (INHALATION) AS NEEDED
Status: DISCONTINUED | OUTPATIENT
Start: 2024-02-13 | End: 2024-02-13 | Stop reason: HOSPADM

## 2024-02-13 RX ORDER — DIPHENHYDRAMINE HYDROCHLORIDE 50 MG/ML
50 INJECTION INTRAMUSCULAR; INTRAVENOUS AS NEEDED
Status: DISCONTINUED | OUTPATIENT
Start: 2024-02-13 | End: 2024-02-13 | Stop reason: HOSPADM

## 2024-02-13 RX ORDER — FAMOTIDINE 10 MG/ML
20 INJECTION INTRAVENOUS ONCE AS NEEDED
Status: CANCELLED | OUTPATIENT
Start: 2024-03-01

## 2024-02-13 RX ORDER — FAMOTIDINE 10 MG/ML
20 INJECTION INTRAVENOUS ONCE AS NEEDED
Status: DISCONTINUED | OUTPATIENT
Start: 2024-02-13 | End: 2024-02-13 | Stop reason: HOSPADM

## 2024-02-13 RX ORDER — ALBUTEROL SULFATE 0.83 MG/ML
3 SOLUTION RESPIRATORY (INHALATION) AS NEEDED
Status: CANCELLED | OUTPATIENT
Start: 2024-03-01

## 2024-02-13 RX ADMIN — GOSERELIN ACETATE 3.6 MG: 3.6 IMPLANT SUBCUTANEOUS at 10:39

## 2024-02-13 RX ADMIN — LIDOCAINE HYDROCHLORIDE 20 MG: 10 INJECTION, SOLUTION EPIDURAL; INFILTRATION; INTRACAUDAL; PERINEURAL at 10:39

## 2024-02-13 ASSESSMENT — PAIN SCALES - GENERAL: PAINLEVEL: 0-NO PAIN

## 2024-02-26 ENCOUNTER — OFFICE VISIT (OUTPATIENT)
Dept: OBSTETRICS AND GYNECOLOGY | Facility: CLINIC | Age: 49
End: 2024-02-26
Payer: MEDICARE

## 2024-02-26 VITALS
WEIGHT: 288 LBS | SYSTOLIC BLOOD PRESSURE: 153 MMHG | HEIGHT: 68 IN | HEART RATE: 78 BPM | BODY MASS INDEX: 43.65 KG/M2 | DIASTOLIC BLOOD PRESSURE: 90 MMHG

## 2024-02-26 DIAGNOSIS — Z00.00 ENCOUNTER FOR ANNUAL PHYSICAL EXAMINATION EXCLUDING GYNECOLOGICAL EXAMINATION IN A PATIENT OLDER THAN 17 YEARS: Primary | ICD-10-CM

## 2024-02-26 PROCEDURE — 99396 PREV VISIT EST AGE 40-64: CPT | Performed by: ADVANCED PRACTICE MIDWIFE

## 2024-02-26 PROCEDURE — 1036F TOBACCO NON-USER: CPT | Performed by: ADVANCED PRACTICE MIDWIFE

## 2024-02-26 PROCEDURE — 3080F DIAST BP >= 90 MM HG: CPT | Performed by: ADVANCED PRACTICE MIDWIFE

## 2024-02-26 PROCEDURE — 3077F SYST BP >= 140 MM HG: CPT | Performed by: ADVANCED PRACTICE MIDWIFE

## 2024-02-26 ASSESSMENT — ENCOUNTER SYMPTOMS
NEUROLOGICAL NEGATIVE: 1
CONSTITUTIONAL NEGATIVE: 1
RESPIRATORY NEGATIVE: 0
PSYCHIATRIC NEGATIVE: 0
GASTROINTESTINAL NEGATIVE: 0
MUSCULOSKELETAL NEGATIVE: 0
CARDIOVASCULAR NEGATIVE: 0
ALLERGIC/IMMUNOLOGIC NEGATIVE: 0
EYES NEGATIVE: 0
HEMATOLOGIC/LYMPHATIC NEGATIVE: 0
ENDOCRINE NEGATIVE: 0

## 2024-02-26 ASSESSMENT — PATIENT HEALTH QUESTIONNAIRE - PHQ9
1. LITTLE INTEREST OR PLEASURE IN DOING THINGS: NOT AT ALL
2. FEELING DOWN, DEPRESSED OR HOPELESS: NOT AT ALL
SUM OF ALL RESPONSES TO PHQ9 QUESTIONS 1 AND 2: 0

## 2024-02-26 ASSESSMENT — PAIN SCALES - GENERAL: PAINLEVEL: 0-NO PAIN

## 2024-02-26 NOTE — PROGRESS NOTES
"Assessment/Plan   Diagnoses and all orders for this visit:  Encounter for annual physical examination excluding gynecological examination in a patient older than 17 years    PLAN:  -Discussed using lubricant for sexual activity. No signs of atrophy or lesions on exam.   -UTD on pap.   -Continue follow-up care with breast center as scheduled.   -RTC 1 yr for annual exam or sooner prn.     Osvaldo Lomeli, APRN-CNM     Subjective   Floresita Terrell is a 49 y.o. female who presents for annual exam.   She did not take her hydrochlorothiazide this morning but states that her BP at home are usually 120s/80s.     Concerns today:  -Recently completed breast cancer treatment and got to ring the bell! S/p right mastectomy, still being seen in the high risk breast center.       GYN screening history: last pap: was normal. The patient is not taking hormone replacement therapy.  No periods x5 years d/t anastrozole .  The patient participates in regular exercise: yes, regularly goes to [employer]. The patient reports that there is not domestic violence in their life.     Sexual Activity: sexually active, male partners; Patient reports 1 partners in the last 12 months.  Pain with intercourse? Yes, related to dryness, improved with lube   Loss of desire? No   Able to have an orgasm? Yes     Last pap: 1/2023 NILM, HPV neg  History of abnormal Pap smear: yes - remote abnormal, can't remember result or age  Family history of uterine or ovarian cancer: no    Last mammogram: 9/2023, s/p breast cancer treatment, still sees high risk breast team  History of abnormal mammogram: yes - see above  Family history of breast cancer: no  Menstrual History:  OB History    No obstetric history on file.        Menarche age: 13  No LMP recorded.       Objective   /90   Pulse 78   Ht 1.727 m (5' 8\")   Wt 131 kg (288 lb)   BMI 43.79 kg/m²   Physical Exam  Constitutional:       Appearance: Normal appearance.   Genitourinary:      " Vulva and urethral meatus normal.      Right Labia: rash.      Right Labia: No tenderness, lesions or skin changes.     Left Labia: No tenderness, lesions, skin changes or rash.     No vaginal discharge, erythema or tenderness.      No vaginal prolapse present.     No vaginal atrophy present.     Adnexa exam comments: Difficult to palpate though not tender.      No cervical motion tenderness.   HENT:      Mouth/Throat:      Mouth: Mucous membranes are dry.   Eyes:      Conjunctiva/sclera: Conjunctivae normal.   Pulmonary:      Effort: Pulmonary effort is normal.   Chest:      Comments: Right breast and nipple surgically absent, incisions well-healed.   Bilateral palpation of chest, left breast, and left axillary nodes without any masses or abnormal skin findings outside of surgical scarring.  Abdominal:      General: Abdomen is flat. There is no distension.      Palpations: Abdomen is soft.      Tenderness: There is no abdominal tenderness.   Musculoskeletal:      Cervical back: Neck supple.   Lymphadenopathy:      Upper Body:      Right upper body: No supraclavicular, axillary or pectoral adenopathy.      Left upper body: No supraclavicular, axillary or pectoral adenopathy.   Neurological:      Mental Status: She is alert and oriented to person, place, and time.   Skin:     General: Skin is warm and dry.   Psychiatric:         Mood and Affect: Mood normal.         Behavior: Behavior normal.

## 2024-03-01 ENCOUNTER — APPOINTMENT (OUTPATIENT)
Dept: HEMATOLOGY/ONCOLOGY | Facility: CLINIC | Age: 49
End: 2024-03-01
Payer: MEDICARE

## 2024-11-08 ENCOUNTER — OFFICE VISIT (OUTPATIENT)
Dept: HEMATOLOGY/ONCOLOGY | Facility: CLINIC | Age: 49
End: 2024-11-08
Payer: MEDICARE

## 2024-11-08 VITALS
TEMPERATURE: 98.3 F | SYSTOLIC BLOOD PRESSURE: 124 MMHG | HEART RATE: 80 BPM | BODY MASS INDEX: 42.96 KG/M2 | DIASTOLIC BLOOD PRESSURE: 86 MMHG | WEIGHT: 282.52 LBS

## 2024-11-08 DIAGNOSIS — C50.411 MALIGNANT NEOPLASM OF UPPER-OUTER QUADRANT OF RIGHT FEMALE BREAST, UNSPECIFIED ESTROGEN RECEPTOR STATUS: ICD-10-CM

## 2024-11-08 DIAGNOSIS — N95.1 VAGINAL DRYNESS, MENOPAUSAL: ICD-10-CM

## 2024-11-08 DIAGNOSIS — L98.9 SKIN LESIONS: Primary | ICD-10-CM

## 2024-11-08 PROCEDURE — 3074F SYST BP LT 130 MM HG: CPT | Performed by: NURSE PRACTITIONER

## 2024-11-08 PROCEDURE — 99215 OFFICE O/P EST HI 40 MIN: CPT | Performed by: NURSE PRACTITIONER

## 2024-11-08 PROCEDURE — 3079F DIAST BP 80-89 MM HG: CPT | Performed by: NURSE PRACTITIONER

## 2024-11-08 ASSESSMENT — PAIN SCALES - GENERAL: PAINLEVEL_OUTOF10: 0-NO PAIN

## 2024-11-08 NOTE — PROGRESS NOTES
Oncology Follow-Up    Floresita Terrell  [id number]       AJCC Edition: 8th (AJCC), Diagnosis Date: Oct 2018, IA, pT1a pN0 cM0 G3     Oncology History    No history exists.   1. Screening mammogram 8/21/18 showed calcifications right breast confirmed on diagnostic imaging 9/5/18.   2. Right breast calcification stereotactic biopsies 9/18/18 showed high grade DCIS  with necrosis in 1 of 2 specimens, ER 40% OR <1%.  3. Right breast excision 10/12/18 x 2 showed DCIS 1.4 cm ER 0% OR 0%  and invasive ductal carcinoma 0.4 cm arising in DCIS with EIC. Invasive carcinoma was ER  40% OR 0% HER2 3+. Given the low risk of recurrence, chemotherapy with Herceptin was not given.   4. She underwent right mastectomy and immediate reconstruction with prepectoral tissue expander  12/7/18.  She tolerated this well, however, developed a hematoma of the right mastectomy pocket that evening requiring operative drainage and re-closure. Pathology showed no residual invasive ductal carcinoma or DCIS and 0/5 SLN/ALN.   5. Goserelin initiated 1/2019 and anastrozole  for five years added 2/12/19 and zoledronic acid every 6 months for 3 years started 3/13/2019 and completed August 2021.  6. Genetic testing on 2/11/19 25 gene panel negative.  7. On 3/11/20: Second stage right breast reconstruction with right latissimus dorsi myocutaneous flap, matching left breast reduction.  8. Goserlin discontinued January 2019. Zometa discontinued February 2024 after 5 years of treatment.      Rex Canas presents for her Oncology Follow Up Visit. She asks about having the extra skin removed from the mastectomy and reduction sites. She reports vaginal dryness and discomfort with intercourse. She is having hot flashes. Floresita has not seen GYN for some time. She has not had a period for 5 years since started goserelin. She had pain in her right back and flank with recent rigorous exercise where her TE reconstruction site is. She rates her energy  "level as 6/10 and the only distress she has is fear of recurrence though it does not consume her. Floresita denies any unusual headaches, balance issues, depression, cough, shortness of breath, problems swallowing, changes in chest/breast area, abdominal pain, bone or muscle pain, vaginal bleeding, rectal bleeding, blood in the urine, vaginal dryness, swelling arms or legs. She does point out small round, hyperpigmented areas in both her arm where her glucose monitor used to be. She also has one on her right calf.     Objective      Vitals:    11/08/24 1139   BP: 124/86   Pulse: 80   Temp: 36.8 °C (98.3 °F)        Constitutional: Well developed, alert/oriented x3, no distress, cooperative   Eyes: clear sclera   ENMT: mucous membranes moist, no apparent lesions   Head/Neck: Neck supple, no bruits   Respiratory/Thorax: Patent airways, normal breath sounds with good chest expansion   Cardiovascular: Regular rate and rhythm, no murmurs, 2+ equal pulses of the extremities,   Gastrointestinal: Nondistended, soft, non-tender, no masses palpable, no organomegaly   Musculoskeletal: ROM intact, no joint swelling, normal strength   Extremities: normal extremities, no edema, cyanosis, contusions or wounds   Neurological: alert and oriented x3,  normal strength   Breast:  Right mastectomy with TE reconstruction; no masses, nodules, skin changes, discharge. Left breast without masses, nodules, skin changes, discharge, + for reduction mammoplasty with well healed incisions. Both breasts with mild \"dog ears\" lateral breast area   Lymphatic: No significant lymphadenopathy   Psychological: Appropriate mood and behavior   Skin: Warm and dry, no lesions, no rashes      Physical Exam     Lab Results   Component Value Date    WBC 15.4 (H) 07/28/2023    HGB 11.8 (L) 07/28/2023    HCT 36.8 07/28/2023    MCV 77 (L) 07/28/2023     07/28/2023       Chemistry    Lab Results   Component Value Date/Time     07/28/2023 0258    K 3.6 " 07/28/2023 0258     07/28/2023 0258    CO2 29 07/28/2023 0258    BUN 14 07/28/2023 0258    CREATININE 0.92 07/28/2023 0258    Lab Results   Component Value Date/Time    CALCIUM 9.4 07/28/2023 0258    ALKPHOS 72 07/28/2023 0258    AST 17 07/28/2023 0258    ALT 14 07/28/2023 0258    BILITOT 0.3 07/28/2023 0258         Imaging:    Status Exam Begun Exam Ended   Final 3/14/2023 18:14      Study Result    Narrative   Interpreted By:  DOMITILA GANDARA MD  MRN: [id number]  Patient Name: LINH JAIMES     STUDY:  DIGITAL DIAG MAMM LEFT W/TONA UNILAT; BREAST ULTRASOUND;  4/5/2023  10:26 am; 4/5/2023 10:49 am     ACCESSION NUMBER(S):  [id number]; [id number]     ORDERING CLINICIAN:  TRINIDAD DIAZ     INDICATION:  The patient was recalled from recent screening mammogram 03/07/2023  for evaluation of a lateral left breast asymmetry. History of right  mastectomy and left breast reduction. Patient reports recent  vaccination to the left upper extremity..     COMPARISON:  Left mammogram 03/07/2023 with comparison 03/04/2022, 03/03/2021,  08/31/2020     FINDINGS:  MAMMOGRAPHY: 2D and tomosynthesis images were reviewed at 1 mm slice  thickness.     There are areas of scattered fibroglandular tissue.  There is a  persisting oval circumscribed equal density asymmetry in the lateral  aspect of the left breast at mid depth, localizing centrally on  tomosynthesis. This is not seen in the orthogonal projection. There  is stable post reduction redistribution of glandular tissue within  the remainder of the left breast.     ULTRASOUND:  Sonographic scanning of the entire lateral left breast  confirms an intramammary lymph node at the 3 o'clock position of the  left breast 11 cm from the nipple measuring 1.1 x 0.7 x 0.8 cm. There  is a uniform 3 mm hypoechoic cortex, a maintain fatty hilum with  Doppler blood flow, and soft elastography features. There is a 2nd  benign-appearing lymph node at 3:00, 11 cm from the nipple measuring  1.2 by 0.6  x 1.3 cm with a uniform 2 mm cortex. Either lymph node  could correlate with the asymmetry visualized on mammogram.     Incidentally noted is a benign simple cyst at the 3 o'clock position  subareolar region measuring 9 mm.      Impression   2 benign-appearing intramammary lymph nodes, 3 o'clock position left  breast, 11 cm from the nipple with 1 lymph node demonstrating  borderline uniform cortical prominence. This may relate to recent  vaccination. Targeted ultrasound in 3-4 months recommended to  document stability or interval improvement.     BI-RADS CATEGORY:     Category: 3 - Probably Benign.  Recommendation: 3-4 month ultrasound follow-up of lymph node 1       Status Exam Begun Exam Ended   Final  8/08/2023 11:38     Study Result    Narrative   Interpreted By:  DOMITILA GANDARA MD  MRN: [id number]  Patient Name: LINH JAIMES     STUDY:  BREAST ULTRASOUND;  8/8/2023 11:38 am     ACCESSION NUMBER(S):  [id number]     ORDERING CLINICIAN:  TRINIDAD DIAZ     INDICATION:  hx breast cancer, left axillary lymphadenopathy, recommend 3-4 month  u/s.  C50.911: Malignant neoplasm of right breast. Recent  mammographic and sonographic imaging of the left breast 04/05/2023  demonstrated 2 left intramammary lymph nodes with prominent cortices.  The patient had a recent vaccination to the left upper extremity.  This is a follow-up study.     COMPARISON:  Left mammogram and ultrasound 04/05/2023. Left breast and axillary  ultrasound 03/03/2021, 12/01/2020, 08/31/2020.     FINDINGS:  The findings are stable. There are 2 intramammary lymph nodes within  the left breast at 3 o'clock position cm from the nipple maintaining  fatty radha with uniform thickened cortices. Additional scanning of  the left axilla demonstrates 3 axillary lymph nodes with uniform  thickened cortices. When accounting for differences in technique, the  sonographic findings are unchanged dating back to 08/31/2020.      Impression   Stable prominent left  intramammary and left axillary lymph nodes,  unchanged dating back to 08/31/2020. These are deemed benign.     Annual left mammogram in April 2024 recommended.     BI-RADS CATEGORY:     Category: 2 - Benign.  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 She is s/p right mastectomy, and completed 5 years of goserelin for ovarian suppression and anastrozole/letrozole in February 2024. There is no evidence of recurrent disease on today's exam.     Plan:  Exam is negative.   To schedule mammogram that is due soon.  Recommended products by [facility] for vaginal dryness and hot flashes. These are all natural products for women.  See dermatology for the new lesions on your sides and leg.  Encouraged monthly breast self exams, plant based diet, keep alcohol <3 drinks/week, and continue to exercise at least 2.5 hours/week.  We reviewed signs/symptoms of recurrence including new masses, new pigmented lesion, tugging or pulling of the skin, nipple discharge, rash in or around the chest area, or any new finding that doesn't resolve within a 2-3 weeks.  All of Floresita's questions/concerns were addressed.  Over 25 minutes of time was spent with this patient with >50% of the time with education, counseling, and coordination of care.   I will see Floresita back in one year with her mammogram. She will call with any concerns.        Diagnoses and all orders for this visit:  Skin lesions  -     Referral to Dermatology  Malignant neoplasm of upper-outer quadrant of right female breast, unspecified estrogen receptor status  -     Clinic Appointment Request Chemo Follow Up  -     Clinic Appointment Request Follow Up; TRINIDAD DIAZ; Future  -     BI mammo left screening tomosynthesis; Future  -     BI mammo left screening tomosynthesis; Future  -     Referral to Dermatology  Vaginal dryness, menopausal      Trinidad Diaz  APRN-CNP

## 2024-11-08 NOTE — PATIENT INSTRUCTIONS
Please call us at [phone] option 5 then option 2 with any questions or concerns       1. Exercise 2.5 hours per week; bone strengthening, cardio-vascular, resistance training.  2. Please do self breast exams monthly.  3. Keep alcohol under 3 drinks per week.  4. Sun safety - limit sun exposure from 11a-2p when its at its hottest, apply 15-30 sun block and re-apply every 1-2 hours if perspiring or swimming.  5. Eat a plant based diet, add in oily fishes such as mackerel, tuna, and salmon.  6. Get in at least 1,000 mg of calcium per day through diet or supplement for bone strength. Examples of foods higher in calcium are milk, yogurt, fruited yogurt, oranges, fortified orange juice, almonds, almond milk, broccoli, spinach, bok toni, mustard greens, puddings, custards, ice cream, fortified cereals, bars, and crackers.   7. Exam was negative  8. Please call the office if any new mass or rash in or around breast, or any uncontrolled symptoms that last over 2-3 weeks at [phone].  9. I will refer you to dermatology for the skin lesions on your sides and your leg.  10. It was nice seeing you today, Floresita. I will see you back next year.   Have a Happy and Healthy Holiday Season.  Thank you for choosing [facility] for your care.

## 2024-11-12 ENCOUNTER — HOSPITAL ENCOUNTER (OUTPATIENT)
Dept: RADIOLOGY | Facility: CLINIC | Age: 49
Discharge: HOME | End: 2024-11-12
Payer: MEDICARE

## 2024-11-12 ENCOUNTER — APPOINTMENT (OUTPATIENT)
Dept: RADIOLOGY | Facility: CLINIC | Age: 49
End: 2024-11-12
Payer: MEDICARE

## 2024-11-12 DIAGNOSIS — C50.411 MALIGNANT NEOPLASM OF UPPER-OUTER QUADRANT OF RIGHT FEMALE BREAST, UNSPECIFIED ESTROGEN RECEPTOR STATUS: ICD-10-CM

## 2024-11-12 PROCEDURE — 77067 SCR MAMMO BI INCL CAD: CPT | Mod: LEFT SIDE | Performed by: RADIOLOGY

## 2024-11-12 PROCEDURE — 77063 BREAST TOMOSYNTHESIS BI: CPT | Mod: LEFT SIDE | Performed by: RADIOLOGY

## 2024-11-12 PROCEDURE — 77067 SCR MAMMO BI INCL CAD: CPT | Mod: 52,LT

## 2024-11-13 PROBLEM — N95.1 VAGINAL DRYNESS, MENOPAUSAL: Status: ACTIVE | Noted: 2024-11-13

## 2024-11-13 PROBLEM — L98.9 SKIN LESIONS: Status: ACTIVE | Noted: 2023-04-25

## 2024-12-06 DIAGNOSIS — I10 PRIMARY HYPERTENSION: Primary | ICD-10-CM

## 2024-12-06 RX ORDER — HYDROCHLOROTHIAZIDE 25 MG/1
25 TABLET ORAL DAILY
Qty: 90 TABLET | Refills: 3 | Status: SHIPPED | OUTPATIENT
Start: 2024-12-06

## 2024-12-10 ENCOUNTER — APPOINTMENT (OUTPATIENT)
Dept: OBSTETRICS AND GYNECOLOGY | Facility: CLINIC | Age: 49
End: 2024-12-10
Payer: MEDICARE

## 2025-01-08 ENCOUNTER — HOSPITAL ENCOUNTER (OUTPATIENT)
Dept: RADIOLOGY | Facility: HOSPITAL | Age: 50
Discharge: HOME | End: 2025-01-08
Payer: COMMERCIAL

## 2025-01-08 ENCOUNTER — OFFICE VISIT (OUTPATIENT)
Dept: ORTHOPEDIC SURGERY | Facility: HOSPITAL | Age: 50
End: 2025-01-08
Payer: COMMERCIAL

## 2025-01-08 DIAGNOSIS — M25.562 LEFT KNEE PAIN, UNSPECIFIED CHRONICITY: ICD-10-CM

## 2025-01-08 DIAGNOSIS — M17.12 OSTEOARTHRITIS OF LEFT KNEE, UNSPECIFIED OSTEOARTHRITIS TYPE: ICD-10-CM

## 2025-01-08 DIAGNOSIS — M25.562 LEFT KNEE PAIN, UNSPECIFIED CHRONICITY: Primary | ICD-10-CM

## 2025-01-08 PROCEDURE — 73564 X-RAY EXAM KNEE 4 OR MORE: CPT | Mod: LT

## 2025-01-08 PROCEDURE — 20610 DRAIN/INJ JOINT/BURSA W/O US: CPT | Mod: LT | Performed by: PHYSICIAN ASSISTANT

## 2025-01-08 PROCEDURE — 2500000004 HC RX 250 GENERAL PHARMACY W/ HCPCS (ALT 636 FOR OP/ED): Performed by: PHYSICIAN ASSISTANT

## 2025-01-08 PROCEDURE — 99213 OFFICE O/P EST LOW 20 MIN: CPT | Performed by: PHYSICIAN ASSISTANT

## 2025-01-08 PROCEDURE — 99213 OFFICE O/P EST LOW 20 MIN: CPT | Mod: 25 | Performed by: PHYSICIAN ASSISTANT

## 2025-01-08 RX ADMIN — METHYLPREDNISOLONE ACETATE 40 MG: 40 INJECTION, SUSPENSION INTRALESIONAL; INTRAMUSCULAR; INTRASYNOVIAL; SOFT TISSUE at 13:53

## 2025-01-08 RX ADMIN — LIDOCAINE HYDROCHLORIDE 4 ML: 10 INJECTION, SOLUTION INFILTRATION; PERINEURAL at 13:53

## 2025-01-08 NOTE — PROGRESS NOTES
Patient is here today regarding left knee pain.  She had seen Dr. Torres several years ago for similar pain. She had a cortisone injection that helped significantly.  Pain recently returned about a month ago. No specific injury.  Pain is diffuse through the knee and worse with any activity.     Patient is in no acute distress.  They are alert and oriented x3.  They are of normal mood and affect.  They are in no acute distress.  The patient's limb is warm and well-perfused.  They have intact sensation to light touch in all lower extremity dermatomes.  Pain with soft tissue palpation.  There is a minimal effusion.    The patient's quadriceps and hamstring strength is 5 of 5.    The patient can do a straight leg raise with no radicular pain.  negative lachmans. negative posterior drawer.  There is 1+ patellofemoral crepitus.   The knee is stable to varus and valgus stress at both 0 and 30°.  There is no tenderness along the medial or lateral joint line.  negative McMurrays    Updated xrays show DJD worse in the medial and PF compartments.       Patient ID: Floresita Terrell is a 49 y.o. female.    L Inj/Asp: L knee on 1/8/2025 1:53 PM  Indications: pain  Details: 22 G needle, anterior approach  Medications: 40 mg methylPREDNISolone acetate 40 mg/mL; 4 mL lidocaine 10 mg/mL (1 %)    Under sterile conditions the left knee was injected with 4cc of lidocaine 40mg DepoMedrol.  The patient tolerated the procedure well.  There were no apparent complications.  Sterile bandage was applied.  Procedure, treatment alternatives, risks and benefits explained, specific risks discussed. Consent was given by the patient. Immediately prior to procedure a time out was called to verify the correct patient, procedure, equipment, support staff and site/side marked as required. Patient was prepped and draped in the usual sterile fashion.       Chayo Beck PA-C

## 2025-01-09 RX ORDER — METHYLPREDNISOLONE ACETATE 40 MG/ML
40 INJECTION, SUSPENSION INTRA-ARTICULAR; INTRALESIONAL; INTRAMUSCULAR; SOFT TISSUE
Status: COMPLETED | OUTPATIENT
Start: 2025-01-08 | End: 2025-01-08

## 2025-01-09 RX ORDER — LIDOCAINE HYDROCHLORIDE 10 MG/ML
4 INJECTION, SOLUTION INFILTRATION; PERINEURAL
Status: COMPLETED | OUTPATIENT
Start: 2025-01-08 | End: 2025-01-08

## 2025-01-16 ENCOUNTER — APPOINTMENT (OUTPATIENT)
Dept: PRIMARY CARE | Facility: CLINIC | Age: 50
End: 2025-01-16
Payer: COMMERCIAL

## 2025-01-16 VITALS
BODY MASS INDEX: 43.09 KG/M2 | DIASTOLIC BLOOD PRESSURE: 90 MMHG | SYSTOLIC BLOOD PRESSURE: 160 MMHG | TEMPERATURE: 98.4 F | RESPIRATION RATE: 16 BRPM | WEIGHT: 284.3 LBS | HEIGHT: 68 IN | HEART RATE: 70 BPM

## 2025-01-16 DIAGNOSIS — Z00.00 ANNUAL PHYSICAL EXAM: ICD-10-CM

## 2025-01-16 DIAGNOSIS — E78.49 OTHER HYPERLIPIDEMIA: ICD-10-CM

## 2025-01-16 DIAGNOSIS — I10 PRIMARY HYPERTENSION: ICD-10-CM

## 2025-01-16 DIAGNOSIS — C50.911 MALIGNANT NEOPLASM OF RIGHT BREAST IN FEMALE, ESTROGEN RECEPTOR POSITIVE, UNSPECIFIED SITE OF BREAST: ICD-10-CM

## 2025-01-16 DIAGNOSIS — E78.49 OTHER HYPERLIPIDEMIA: Primary | ICD-10-CM

## 2025-01-16 DIAGNOSIS — E11.65 UNCONTROLLED TYPE 2 DIABETES MELLITUS WITH HYPERGLYCEMIA, WITHOUT LONG-TERM CURRENT USE OF INSULIN: ICD-10-CM

## 2025-01-16 DIAGNOSIS — Z11.59 NEED FOR HEPATITIS C SCREENING TEST: ICD-10-CM

## 2025-01-16 DIAGNOSIS — D84.821 IMMUNODEFICIENCY DUE TO DRUGS (CODE): ICD-10-CM

## 2025-01-16 DIAGNOSIS — E66.01 OBESITY, CLASS III, BMI 40-49.9 (MORBID OBESITY) (MULTI): ICD-10-CM

## 2025-01-16 DIAGNOSIS — Z17.0 MALIGNANT NEOPLASM OF RIGHT BREAST IN FEMALE, ESTROGEN RECEPTOR POSITIVE, UNSPECIFIED SITE OF BREAST: ICD-10-CM

## 2025-01-16 DIAGNOSIS — F41.8 DEPRESSION WITH ANXIETY: ICD-10-CM

## 2025-01-16 PROCEDURE — 3080F DIAST BP >= 90 MM HG: CPT | Performed by: INTERNAL MEDICINE

## 2025-01-16 PROCEDURE — 99396 PREV VISIT EST AGE 40-64: CPT | Performed by: INTERNAL MEDICINE

## 2025-01-16 PROCEDURE — 1036F TOBACCO NON-USER: CPT | Performed by: INTERNAL MEDICINE

## 2025-01-16 PROCEDURE — 3077F SYST BP >= 140 MM HG: CPT | Performed by: INTERNAL MEDICINE

## 2025-01-16 PROCEDURE — 93000 ELECTROCARDIOGRAM COMPLETE: CPT | Performed by: INTERNAL MEDICINE

## 2025-01-16 PROCEDURE — 3008F BODY MASS INDEX DOCD: CPT | Performed by: INTERNAL MEDICINE

## 2025-01-16 RX ORDER — TIRZEPATIDE 2.5 MG/.5ML
2.5 INJECTION, SOLUTION SUBCUTANEOUS
Qty: 3 ML | Refills: 1 | Status: SHIPPED | OUTPATIENT
Start: 2025-01-19 | End: 2025-01-17

## 2025-01-16 RX ORDER — TIRZEPATIDE 2.5 MG/.5ML
2.5 INJECTION, SOLUTION SUBCUTANEOUS WEEKLY
Qty: 3 ML | Refills: 1 | Status: SHIPPED | OUTPATIENT
Start: 2025-01-16 | End: 2025-01-16

## 2025-01-16 RX ORDER — ATORVASTATIN CALCIUM 10 MG/1
10 TABLET, FILM COATED ORAL DAILY
Qty: 100 TABLET | Refills: 3 | Status: SHIPPED | OUTPATIENT
Start: 2025-01-16 | End: 2026-02-20

## 2025-01-16 RX ORDER — AMLODIPINE BESYLATE 5 MG/1
5 TABLET ORAL DAILY
Qty: 30 TABLET | Refills: 5 | Status: SHIPPED | OUTPATIENT
Start: 2025-01-16 | End: 2025-07-15

## 2025-01-16 NOTE — PROGRESS NOTES
Floresita Terrell is a 49 y.o. female   HPI     Review of Systems     Constitutional: not feeling poorly, no fever, no recent weight gain and no recent weight loss.   Eyes: no blurred vision and no diplopia.   ENT: no hearing loss, no tinnitus, no earache, no sore throat, no hoarseness and no swollen glands in the neck.   Cardiovascular: no chest pain, no tightness or heavy pressure, no shortness of breath, no palpitations and no lower extremity edema.   Respiratory: no cough, wheezing or shortness of breath at rest or exertion  Gastrointestinal: no change in bowel habits, no diarrhea, no constipation, no bloody stools, no nausea, no vomiting, no abdominal pain, no signs and symptoms of ulcer disease, no ricci colored stools and no intolerance to fatty foods.   Genitourinary: no urinary frequency, no dysuria, no hematuria, no burning sensation during urination, urinary stream is not smaller and urinary stream does not start and stop.   Musculoskeletal: no arthralgias, no joint stiffness, no muscle weakness, no back pain and no difficulty walking.   Skin: no rashes, no change in skin color and pigmentation, no skin lesions and no skin lumps.   Neurological: no headaches, no dizziness, no seizures, no tingling, no numbness, no signs and symptoms of stroke and no limb weakness.   Psychiatric: no confusion, no memory lapses or loss, no depression and no sleep disturbances.   Endocrine:  no excessive thirst, no dry skin, no cold intolerance, no heat intolerance and no increased urinary frequency.   Hematologic/Lymphatic: is not slow to heal, does not bleed easily, does not bruise easily, no thrombophlebitis, no anemia and no history of blood transfusion.   All other systems have been reviewed and are negative for complaint.     Current Outpatient Medications   Medication Instructions    albuterol 90 mcg/actuation inhaler 1-2 puffs    citalopram (CeleXA) 20 mg tablet TAKE 1 TABLET BY MOUTH EVERY DAY    diclofenac sodium  (Voltaren) 1 % gel gel PLEASE SEE ATTACHED FOR DETAILED DIRECTIONS    fluticasone (Flonase) 50 mcg/actuation nasal spray 2 sprays, Each Nostril, Daily    hydroCHLOROthiazide (HYDRODIURIL) 25 mg, oral, Daily    naproxen (NAPROSYN) 500 mg, 2 times daily PRN    naproxen ER (Naprelan) 375 mg 24 hr tablet 2 tablets, Daily    NON FORMULARY Take by mouth. Vitamin D Tabs       There were no vitals filed for this visit.     Physical Exam     Constitutional   General appearance: Alert and in no acute distress.   Eyes   Inspection of eyes: Sclera and conjunctiva were normal.    Pupil exam: Pupils were equal in size. Extraocular movements were intact.   Ears, Nose, Mouth, and Throat   Ears: Auricles: Normal.    Otoscopic examination: Tympanic membranes: Normal with no congestion and no discharge. Otic Canals: Normal without tenderness, congestion or discharge.    Oropharynx: Normal with moist mucus membranes, no congestion. Tonsils: Normal no follicles.    Hearing: Normal.     Nasal mucosa, septum, and turbinates: Normal without edema or erythema.    Lips, teeth, and gums: Normal.   Neck   Neck Exam: Appearance of the neck was normal. No neck masses observed.    Thyroid exam: Not enlarged and no palpable thyroid nodules.   Pulmonary   Respiratory assessment: No respiratory distress, normal respiratory rhythm and effort.    Auscultation of Lungs: Clear bilateral breath sounds.   Cardiovascular   Auscultation of heart: Apical pulse normal, heart rate and rhythm normal, normal S1 and S2, no murmurs and no pericardial rub.    Carotid arteries: Pulses normal with no bruits.    Femoral pulses: Normal without bruits.    Exam for edema: No peripheral edema.    Abdominal aorta: Normal.     Pedal pulses: 2+ bilaterally.   Chest   Breast inspection: NOT EXAMINED  Chest: Normal A_P diameter, no pulsation, no intercostal withdrawing. Trachea central.   Abdomen   Abdominal Exam: No bruits, normal bowel sounds, soft, non-tender, no abdominal  mass palpated.    Liver and Spleen exam: No hepato-splenomegaly.    Rectal exam: Deferred  Genitourinary   DEFER TO GYNECOLOGY    Lymphatic   Palpation of lymph nodes in neck: No cervical lymphadenopathy.   Musculoskeletal   Examination of gait: Normal.    Inspection of digits and nails: No clubbing or cyanosis of the fingernails.    Inspection/palpation of joints, bones and muscles: No joint swelling. Normal movement of all extremities.    Range of Motion: Normal movement of all extremities.    Assessment of Stability: Normal.    Muscle strength/tone: Normal.    Skin   Skin inspection: Normal skin color and pigmentation, normal skin turgor and no visible rash.    Examination of the skin for lesions: Normal.    Neurologic   Cranial nerves: Nerves 2-12 were intact, no focal neuro defects.    Cortical function: Normal.     Reflexes: Normal.     Sensation: Normal.     Coordination: Normal.    Psychiatric    Judgment and insight: Intact.    Orientation: Oriented to person, place, and time.    Mood and affect: Normal.    Recent and remote memory: Normal.          Assessment/Plan      Problem List Items Addressed This Visit    None       Patient with a past medical history of DM, HTN, HLD, Morbid obesity, Breast CA, Depression, Breast carcinoma s/p right mastectomy, Mammogram (Nov), Tubular adenoma (2030)        # HTN  Uncontrolled   DC hydrochlorothiazide  Start Amlodipine   Allergic to ACE/ ARB        # DM  Not on any meds as Trulicity was too expensive  Start Mounjaro  Will check A1c        # HLD  Will start statins  Atorva 10 mg  CT calcium score     # ANALI  check levels     Blood work

## 2025-01-17 ENCOUNTER — LAB (OUTPATIENT)
Dept: LAB | Facility: LAB | Age: 50
End: 2025-01-17
Payer: COMMERCIAL

## 2025-01-17 DIAGNOSIS — E11.65 UNCONTROLLED TYPE 2 DIABETES MELLITUS WITH HYPERGLYCEMIA, WITHOUT LONG-TERM CURRENT USE OF INSULIN: ICD-10-CM

## 2025-01-17 DIAGNOSIS — E78.49 OTHER HYPERLIPIDEMIA: ICD-10-CM

## 2025-01-17 DIAGNOSIS — Z11.59 NEED FOR HEPATITIS C SCREENING TEST: ICD-10-CM

## 2025-01-17 DIAGNOSIS — Z00.00 ANNUAL PHYSICAL EXAM: ICD-10-CM

## 2025-01-17 DIAGNOSIS — I10 PRIMARY HYPERTENSION: ICD-10-CM

## 2025-01-17 DIAGNOSIS — E66.01 OBESITY, CLASS III, BMI 40-49.9 (MORBID OBESITY) (MULTI): ICD-10-CM

## 2025-01-17 LAB
25(OH)D3 SERPL-MCNC: 17 NG/ML (ref 30–100)
ALBUMIN SERPL BCP-MCNC: 4.1 G/DL (ref 3.4–5)
ALP SERPL-CCNC: 64 U/L (ref 33–110)
ALT SERPL W P-5'-P-CCNC: 12 U/L (ref 7–45)
ANION GAP SERPL CALC-SCNC: 10 MMOL/L (ref 10–20)
AST SERPL W P-5'-P-CCNC: 18 U/L (ref 9–39)
BILIRUB SERPL-MCNC: 0.4 MG/DL (ref 0–1.2)
BUN SERPL-MCNC: 12 MG/DL (ref 6–23)
CALCIUM SERPL-MCNC: 9.4 MG/DL (ref 8.6–10.3)
CHLORIDE SERPL-SCNC: 101 MMOL/L (ref 98–107)
CHOLEST SERPL-MCNC: 169 MG/DL (ref 0–199)
CHOLESTEROL/HDL RATIO: 3.7
CO2 SERPL-SCNC: 32 MMOL/L (ref 21–32)
CREAT SERPL-MCNC: 0.83 MG/DL (ref 0.5–1.05)
EGFRCR SERPLBLD CKD-EPI 2021: 86 ML/MIN/1.73M*2
ERYTHROCYTE [DISTWIDTH] IN BLOOD BY AUTOMATED COUNT: 15.8 % (ref 11.5–14.5)
EST. AVERAGE GLUCOSE BLD GHB EST-MCNC: 166 MG/DL
GLUCOSE SERPL-MCNC: 129 MG/DL (ref 74–99)
HBA1C MFR BLD: 7.4 %
HCT VFR BLD AUTO: 40.4 % (ref 36–46)
HCV AB SER QL: REACTIVE
HDLC SERPL-MCNC: 45.4 MG/DL
HGB BLD-MCNC: 12.4 G/DL (ref 12–16)
IRON SATN MFR SERPL: 20 % (ref 25–45)
IRON SERPL-MCNC: 66 UG/DL (ref 35–150)
LDLC SERPL CALC-MCNC: 104 MG/DL
MCH RBC QN AUTO: 24.5 PG (ref 26–34)
MCHC RBC AUTO-ENTMCNC: 30.7 G/DL (ref 32–36)
MCV RBC AUTO: 80 FL (ref 80–100)
NON HDL CHOLESTEROL: 124 MG/DL (ref 0–149)
NRBC BLD-RTO: 0 /100 WBCS (ref 0–0)
PLATELET # BLD AUTO: 440 X10*3/UL (ref 150–450)
POTASSIUM SERPL-SCNC: 4.4 MMOL/L (ref 3.5–5.3)
PROT SERPL-MCNC: 7 G/DL (ref 6.4–8.2)
RBC # BLD AUTO: 5.06 X10*6/UL (ref 4–5.2)
SODIUM SERPL-SCNC: 139 MMOL/L (ref 136–145)
TIBC SERPL-MCNC: 322 UG/DL (ref 240–445)
TRIGL SERPL-MCNC: 98 MG/DL (ref 0–149)
TSH SERPL-ACNC: 2.26 MIU/L (ref 0.44–3.98)
UIBC SERPL-MCNC: 256 UG/DL (ref 110–370)
VLDL: 20 MG/DL (ref 0–40)
WBC # BLD AUTO: 13.9 X10*3/UL (ref 4.4–11.3)

## 2025-01-17 PROCEDURE — 86803 HEPATITIS C AB TEST: CPT

## 2025-01-17 PROCEDURE — 87522 HEPATITIS C REVRS TRNSCRPJ: CPT

## 2025-01-17 PROCEDURE — 83550 IRON BINDING TEST: CPT

## 2025-01-17 PROCEDURE — 83540 ASSAY OF IRON: CPT

## 2025-01-17 PROCEDURE — 80061 LIPID PANEL: CPT

## 2025-01-17 PROCEDURE — 82306 VITAMIN D 25 HYDROXY: CPT

## 2025-01-17 PROCEDURE — 84443 ASSAY THYROID STIM HORMONE: CPT

## 2025-01-17 PROCEDURE — 83036 HEMOGLOBIN GLYCOSYLATED A1C: CPT

## 2025-01-17 PROCEDURE — 85027 COMPLETE CBC AUTOMATED: CPT

## 2025-01-17 PROCEDURE — 80053 COMPREHEN METABOLIC PANEL: CPT

## 2025-01-17 RX ORDER — TIRZEPATIDE 2.5 MG/.5ML
2.5 INJECTION, SOLUTION SUBCUTANEOUS
Qty: 3 ML | Refills: 1 | Status: SHIPPED | OUTPATIENT
Start: 2025-01-19

## 2025-01-19 LAB
HCV RNA SERPL NAA+PROBE-ACNC: NOT DETECTED K[IU]/ML
HCV RNA SERPL NAA+PROBE-LOG IU: NORMAL {LOG_IU}/ML

## 2025-01-21 ENCOUNTER — APPOINTMENT (OUTPATIENT)
Dept: ORTHOPEDIC SURGERY | Facility: CLINIC | Age: 50
End: 2025-01-21

## 2025-01-21 ENCOUNTER — TELEPHONE (OUTPATIENT)
Dept: PRIMARY CARE | Facility: CLINIC | Age: 50
End: 2025-01-21

## 2025-01-21 DIAGNOSIS — E11.65 UNCONTROLLED TYPE 2 DIABETES MELLITUS WITH HYPERGLYCEMIA, WITHOUT LONG-TERM CURRENT USE OF INSULIN: ICD-10-CM

## 2025-01-21 DIAGNOSIS — Z00.00 ANNUAL PHYSICAL EXAM: ICD-10-CM

## 2025-01-21 DIAGNOSIS — I10 PRIMARY HYPERTENSION: ICD-10-CM

## 2025-01-21 DIAGNOSIS — E78.49 OTHER HYPERLIPIDEMIA: ICD-10-CM

## 2025-01-21 DIAGNOSIS — E66.01 OBESITY, CLASS III, BMI 40-49.9 (MORBID OBESITY) (MULTI): ICD-10-CM

## 2025-01-21 DIAGNOSIS — E11.65 UNCONTROLLED TYPE 2 DIABETES MELLITUS WITH HYPERGLYCEMIA, WITHOUT LONG-TERM CURRENT USE OF INSULIN: Primary | ICD-10-CM

## 2025-01-21 RX ORDER — METFORMIN HYDROCHLORIDE 500 MG/1
500 TABLET ORAL
Qty: 100 TABLET | Refills: 3 | Status: SHIPPED | OUTPATIENT
Start: 2025-01-21 | End: 2026-02-25

## 2025-01-24 DIAGNOSIS — R76.8 HEPATITIS C ANTIBODY POSITIVE IN BLOOD: Primary | ICD-10-CM

## 2025-01-31 RX ORDER — ATORVASTATIN CALCIUM 10 MG/1
10 TABLET, FILM COATED ORAL DAILY
Qty: 100 TABLET | Refills: 3 | Status: SHIPPED | OUTPATIENT
Start: 2025-01-31 | End: 2026-03-07

## 2025-01-31 RX ORDER — AMLODIPINE BESYLATE 5 MG/1
5 TABLET ORAL DAILY
Qty: 30 TABLET | Refills: 5 | Status: SHIPPED | OUTPATIENT
Start: 2025-01-31 | End: 2025-07-30

## 2025-01-31 RX ORDER — METFORMIN HYDROCHLORIDE 500 MG/1
500 TABLET ORAL
Qty: 100 TABLET | Refills: 3 | Status: SHIPPED | OUTPATIENT
Start: 2025-01-31 | End: 2026-03-07

## 2025-03-08 ENCOUNTER — OFFICE VISIT (OUTPATIENT)
Dept: URGENT CARE | Age: 50
End: 2025-03-08
Payer: COMMERCIAL

## 2025-03-08 DIAGNOSIS — L27.0 DERMATITIS DUE TO DRUG REACTION: ICD-10-CM

## 2025-03-08 DIAGNOSIS — T22.211A PARTIAL THICKNESS BURN OF RIGHT FOREARM, INITIAL ENCOUNTER: Primary | ICD-10-CM

## 2025-03-08 RX ORDER — MUPIROCIN 20 MG/G
OINTMENT TOPICAL 2 TIMES DAILY
Qty: 22 G | Refills: 0 | Status: SHIPPED | OUTPATIENT
Start: 2025-03-08 | End: 2025-03-18

## 2025-03-08 NOTE — PROGRESS NOTES
Subjective   Patient ID: Floresita Terrell is a 50 y.o. female. They present today with a chief complaint of No chief complaint on file..    History of Present Illness  Trauma  Floresita Terrell is a pleasant, 49-year-old female who presents for a virtual visit after she sustained a burn to her right forearm. This occurred about 1 week ago. Endorsed burn occurred while cooking with hot oil.  Pain has been mild. The area was treated with Neosporin. Last tetanus booster was reportedly within the last five years. She endorses redness and pruritis, but denies pain, spreading redness, or purulent drainage.  Pruritus and redness began on Tuesday after she started applying Neosporin.    Review of Systems   Constitutional:  Denies fever, chills, malaise, fatigue  Musculoskeletal:  Denies decreased range of motion, extremity swelling, arthralgia, myalgia  Integumentary: See HPI  Neurologic:  Denies numbness, weakness, paresthesia    All other systems are negative                History provided by:  Patient   used: No        Past Medical History  Allergies as of 03/08/2025 - Reviewed 01/16/2025   Allergen Reaction Noted    Sulfa (sulfonamide antibiotics) Hives and Shortness of breath 09/28/2023    Ace inhibitors Cough 09/28/2023    Losartan Dizziness 04/25/2023    Other Myalgia 02/26/2024    Sulfacetamide sodium Unknown 09/28/2023    Sulfamethoxazole Unknown 09/28/2023    Sulfamethoxazole-trimethoprim Unknown 09/28/2023       (Not in a hospital admission)       Past Medical History:   Diagnosis Date    Allergy, unspecified, initial encounter 07/25/2018    Acute allergic reaction    Breast cancer (Multi)     Candidiasis, unspecified     Yeast infection    Chronic sinusitis, unspecified 01/07/2017    Sinobronchitis    Insect bite (nonvenomous), left lower leg, initial encounter (CODE) 07/25/2018    Insect bite of leg, left    Nausea with vomiting, unspecified 02/15/2018    Nausea and vomiting in adult  patient    Otalgia, left ear 02/18/2014    Otalgia of left ear    Other acute postprocedural pain 03/23/2020    Post-operative pain    Other complications of procedures, not elsewhere classified, subsequent encounter 12/12/2018    Non-healing surgical wound, subsequent encounter    Other conditions influencing health status 03/07/2016    History of cough    Other specified diseases of intestine 06/01/2016    Small bowel mass    Other tear of medial meniscus, current injury, unspecified knee, initial encounter 09/28/2015    Medial meniscus tear    Otitis media, unspecified, left ear 03/07/2016    Acute left otitis media    Pain in left ankle and joints of left foot 04/25/2018    Left ankle pain, unspecified chronicity    Pain in left knee 05/14/2018    Left knee pain    Pain in left lower leg 09/05/2018    Pain of left lower leg    Personal history of diseases of the skin and subcutaneous tissue 06/23/2016    History of contact dermatitis    Personal history of other (healed) physical injury and trauma 05/15/2018    History of sprain of ankle    Personal history of other diseases of the female genital tract 07/24/2018    History of abnormal uterine bleeding    Personal history of other diseases of the respiratory system 08/13/2019    History of acute sinusitis    Personal history of other diseases of the respiratory system 07/27/2020    History of acute pharyngitis    Personal history of other infectious and parasitic diseases 02/15/2018    History of viral gastroenteritis    Personal history of other medical treatment 07/24/2018    History of screening mammography    Personal history of other specified conditions 09/10/2018    History of urinary frequency    Personal history of other specified conditions 04/29/2015    History of breast lump    Personal history of other specified conditions 04/07/2017    History of abdominal pain    Personal history of peptic ulcer disease     Personal history of gastric ulcer     Sprain of deltoid ligament of left ankle, initial encounter 04/26/2018    Sprain of deltoid ligament of left ankle, initial encounter    Sprain of unspecified ligament of left ankle, subsequent encounter 05/15/2018    Sprain of left ankle, unspecified ligament, subsequent encounter    Unspecified injury of unspecified foot, initial encounter 01/23/2020    Toe injury    Ventricular premature depolarization 03/20/2015    PVC (premature ventricular contraction)       Past Surgical History:   Procedure Laterality Date    BACK SURGERY  04/29/2015    Back Surgery    BI MAMMO GUIDED BREAST RIGHT LOCALIZATION Right 10/12/2018    BI MAMMO GUIDED LOCALIZATION BREAST RIGHT 10/12/2018 [facility]    BREAST BIOPSY      BREAST SURGERY  04/29/2015    Breast Surgery    MASTECTOMY      OTHER SURGICAL HISTORY  06/22/2020    Knee surgery    OTHER SURGICAL HISTORY  08/29/2016    Lysis Of Peritoneal Adhesions Laparoscopic    OTHER SURGICAL HISTORY  08/29/2016    Laparoscopic Excision Pelvic Peritoneum Mass (___ Cm)    TUBAL LIGATION  04/29/2015    Tubal Ligation        reports that she has quit smoking. Her smoking use included cigarettes. She has been exposed to tobacco smoke. She has never used smokeless tobacco. She reports current drug use. Drug: Marijuana. She reports that she does not drink alcohol.    Review of Systems  Review of Systems                               Objective    There were no vitals filed for this visit.  No LMP recorded. Patient is perimenopausal.    Physical Exam  Constitutional:       General: She is not in acute distress.     Appearance: Normal appearance. She is not ill-appearing, toxic-appearing or diaphoretic.   HENT:      Head: Normocephalic and atraumatic.   Skin:     Findings: Erythema and rash present. No bruising.      Comments: Examination of skin is limited by virtual visit.  Skin was observed and dorsal surface of right forearm with three localized approximately 1 cm circular partial  thickness burns.  No drainage visible, but with surrounding erythema.  Patient palpated the area and it is non-tender and wounds and erythema are not warmer than the surrounding tissue.    Neurological:      General: No focal deficit present.      Mental Status: She is alert and oriented to person, place, and time.         Procedures    Point of Care Test & Imaging Results from this visit  No results found for this visit on 03/08/25.   No results found.    Diagnostic study results (if any) were reviewed by [facility].    Assessment/Plan   Allergies, medications, history, and pertinent labs/EKGs/Imaging reviewed by EZEQUIEL Ramirez-CNP.     Medical Decision Making    Exam is limited to virtual platform.  During interview, patient is awake, alert, oriented times four and in no acute respiratory distress.  No pallor or cyanosis was observed.  Discussed treatment of burns and when to seek in clinic evaluation or emergent care.  Requested she discontinue Neosporin because she may be having a localized reaction to the neomycin component of this topical. There was no report of systemic symptoms and the erythema was localized to the area around the wounds.  Prescribed topical mupirocin.

## 2025-03-08 NOTE — PROGRESS NOTES
Urgent Care Virtual Video Visit    Patient Location: [facility]  Provider Location: [facility]    Video visit completed with realtime synchronous video/audio connection. Informed consent was obtained from the patient. Patient was made aware that my evaluation and diagnosis are limited due to the fact that we are not in the same room during the interview and that this is a virtual encounter that took place via videoconferencing. Patient verbalized understanding.     Patient disposition: Home    Electronically signed by [facility]  12:26 PM

## 2025-03-25 DIAGNOSIS — F32.9 MAJOR DEPRESSIVE DISORDER WITH CURRENT ACTIVE EPISODE, UNSPECIFIED DEPRESSION EPISODE SEVERITY, UNSPECIFIED WHETHER RECURRENT: ICD-10-CM

## 2025-03-25 RX ORDER — CITALOPRAM 20 MG/1
20 TABLET, FILM COATED ORAL DAILY
Qty: 30 TABLET | Refills: 0 | Status: SHIPPED | OUTPATIENT
Start: 2025-03-25 | End: 2025-05-24

## 2025-04-08 ENCOUNTER — APPOINTMENT (OUTPATIENT)
Dept: DERMATOLOGY | Facility: CLINIC | Age: 50
End: 2025-04-08
Payer: COMMERCIAL

## 2025-04-22 ENCOUNTER — HOSPITAL ENCOUNTER (OUTPATIENT)
Dept: RADIOLOGY | Facility: HOSPITAL | Age: 50
Discharge: HOME | End: 2025-04-22
Payer: COMMERCIAL

## 2025-04-22 ENCOUNTER — APPOINTMENT (OUTPATIENT)
Dept: PRIMARY CARE | Facility: CLINIC | Age: 50
End: 2025-04-22
Payer: COMMERCIAL

## 2025-04-22 DIAGNOSIS — E78.49 OTHER HYPERLIPIDEMIA: ICD-10-CM

## 2025-04-22 DIAGNOSIS — E66.01 OBESITY, CLASS III, BMI 40-49.9 (MORBID OBESITY) (MULTI): ICD-10-CM

## 2025-04-22 DIAGNOSIS — I10 PRIMARY HYPERTENSION: ICD-10-CM

## 2025-04-22 DIAGNOSIS — E11.65 UNCONTROLLED TYPE 2 DIABETES MELLITUS WITH HYPERGLYCEMIA, WITHOUT LONG-TERM CURRENT USE OF INSULIN: ICD-10-CM

## 2025-04-22 DIAGNOSIS — Z00.00 ANNUAL PHYSICAL EXAM: ICD-10-CM

## 2025-04-22 PROCEDURE — 75571 CT HRT W/O DYE W/CA TEST: CPT

## 2025-04-22 NOTE — RESULT ENCOUNTER NOTE
Results reviewed.  All tests within normal range.  Please call if you have any questions or concerns.

## 2025-04-28 ENCOUNTER — TELEPHONE (OUTPATIENT)
Dept: PRIMARY CARE | Facility: CLINIC | Age: 50
End: 2025-04-28
Payer: COMMERCIAL

## 2025-04-28 DIAGNOSIS — F32.9 MAJOR DEPRESSIVE DISORDER WITH CURRENT ACTIVE EPISODE, UNSPECIFIED DEPRESSION EPISODE SEVERITY, UNSPECIFIED WHETHER RECURRENT: ICD-10-CM

## 2025-04-28 RX ORDER — CITALOPRAM 20 MG/1
20 TABLET, FILM COATED ORAL DAILY
Qty: 30 TABLET | Refills: 0 | Status: SHIPPED | OUTPATIENT
Start: 2025-04-28 | End: 2025-06-27

## 2025-04-28 NOTE — TELEPHONE ENCOUNTER
PT called in for a refill on her RX: Citalopram 20 mg tablet      [facility]

## 2025-05-20 ENCOUNTER — APPOINTMENT (OUTPATIENT)
Dept: DERMATOLOGY | Facility: CLINIC | Age: 50
End: 2025-05-20
Payer: MEDICARE

## 2025-05-23 ENCOUNTER — APPOINTMENT (OUTPATIENT)
Dept: PRIMARY CARE | Facility: CLINIC | Age: 50
End: 2025-05-23
Payer: COMMERCIAL

## 2025-05-23 VITALS
SYSTOLIC BLOOD PRESSURE: 140 MMHG | WEIGHT: 285 LBS | HEIGHT: 68 IN | DIASTOLIC BLOOD PRESSURE: 80 MMHG | HEART RATE: 68 BPM | BODY MASS INDEX: 43.19 KG/M2 | RESPIRATION RATE: 16 BRPM

## 2025-05-23 DIAGNOSIS — I10 PRIMARY HYPERTENSION: ICD-10-CM

## 2025-05-23 DIAGNOSIS — E78.49 OTHER HYPERLIPIDEMIA: ICD-10-CM

## 2025-05-23 DIAGNOSIS — E66.813 OBESITY, CLASS III, BMI 40-49.9 (MORBID OBESITY): ICD-10-CM

## 2025-05-23 DIAGNOSIS — Z00.00 ANNUAL PHYSICAL EXAM: ICD-10-CM

## 2025-05-23 DIAGNOSIS — E11.65 UNCONTROLLED TYPE 2 DIABETES MELLITUS WITH HYPERGLYCEMIA, WITHOUT LONG-TERM CURRENT USE OF INSULIN: Primary | ICD-10-CM

## 2025-05-23 DIAGNOSIS — F32.9 MAJOR DEPRESSIVE DISORDER WITH CURRENT ACTIVE EPISODE, UNSPECIFIED DEPRESSION EPISODE SEVERITY, UNSPECIFIED WHETHER RECURRENT: ICD-10-CM

## 2025-05-23 LAB
POC FINGERSTICK BLOOD GLUCOSE: 131 MG/DL (ref 70–100)
POC HEMOGLOBIN A1C: 7 % (ref 4.2–6.5)

## 2025-05-23 PROCEDURE — 83036 HEMOGLOBIN GLYCOSYLATED A1C: CPT | Performed by: INTERNAL MEDICINE

## 2025-05-23 PROCEDURE — 3049F LDL-C 100-129 MG/DL: CPT | Performed by: INTERNAL MEDICINE

## 2025-05-23 PROCEDURE — 3077F SYST BP >= 140 MM HG: CPT | Performed by: INTERNAL MEDICINE

## 2025-05-23 PROCEDURE — 1036F TOBACCO NON-USER: CPT | Performed by: INTERNAL MEDICINE

## 2025-05-23 PROCEDURE — 99214 OFFICE O/P EST MOD 30 MIN: CPT | Performed by: INTERNAL MEDICINE

## 2025-05-23 PROCEDURE — 3079F DIAST BP 80-89 MM HG: CPT | Performed by: INTERNAL MEDICINE

## 2025-05-23 PROCEDURE — 82962 GLUCOSE BLOOD TEST: CPT | Performed by: INTERNAL MEDICINE

## 2025-05-23 PROCEDURE — 3051F HG A1C>EQUAL 7.0%<8.0%: CPT | Performed by: INTERNAL MEDICINE

## 2025-05-23 PROCEDURE — 3008F BODY MASS INDEX DOCD: CPT | Performed by: INTERNAL MEDICINE

## 2025-05-23 RX ORDER — METFORMIN HYDROCHLORIDE 500 MG/1
500 TABLET ORAL
Qty: 100 TABLET | Refills: 3 | Status: SHIPPED | OUTPATIENT
Start: 2025-05-23 | End: 2026-06-27

## 2025-05-23 RX ORDER — INSULIN PUMP SYRINGE, 3 ML
EACH MISCELLANEOUS
Qty: 1 EACH | Refills: 1 | Status: SHIPPED | OUTPATIENT
Start: 2025-05-23 | End: 2026-05-23

## 2025-05-23 RX ORDER — CITALOPRAM 20 MG/1
20 TABLET ORAL DAILY
Qty: 30 TABLET | Refills: 0 | Status: SHIPPED | OUTPATIENT
Start: 2025-05-23 | End: 2025-07-22

## 2025-05-23 RX ORDER — AMLODIPINE BESYLATE 5 MG/1
5 TABLET ORAL DAILY
Qty: 30 TABLET | Refills: 5 | Status: SHIPPED | OUTPATIENT
Start: 2025-05-23 | End: 2025-11-19

## 2025-05-23 RX ORDER — ATORVASTATIN CALCIUM 10 MG/1
10 TABLET, FILM COATED ORAL DAILY
Qty: 100 TABLET | Refills: 3 | Status: SHIPPED | OUTPATIENT
Start: 2025-05-23 | End: 2026-06-27

## 2025-05-23 NOTE — PROGRESS NOTES
Florestia Terrell is a 50 y.o. female   Patient with a past medical history of DM, HTN, HLD, Morbid obesity, Breast CA, Depression, Breast carcinoma s/p right mastectomy, Mammogram (Nov), Tubular adenoma (2030)    Feels fine  No chest pain/  SOB/ dizziness  BM OK  Energy level ok  Appetite OK      No polyuria  No polydipsia  Nocturia            Review of Systems     Constitutional: not feeling poorly, no fever, no recent weight gain and no recent weight loss.   Eyes: no blurred vision and no diplopia.   ENT: no hearing loss, no tinnitus, no earache, no sore throat, no hoarseness and no swollen glands in the neck.   Cardiovascular: no chest pain, no tightness or heavy pressure, no shortness of breath, no palpitations and no lower extremity edema.   Respiratory: no cough, wheezing or shortness of breath at rest or exertion  Gastrointestinal: no change in bowel habits, no diarrhea, no constipation, no bloody stools, no nausea, no vomiting, no abdominal pain, no signs and symptoms of ulcer disease, no ricci colored stools and no intolerance to fatty foods.   Genitourinary: no urinary frequency, no dysuria, no hematuria, no burning sensation during urination, urinary stream is not smaller and urinary stream does not start and stop.   Musculoskeletal: no arthralgias, no joint stiffness, no muscle weakness, no back pain and no difficulty walking.   Skin: no rashes, no change in skin color and pigmentation, no skin lesions and no skin lumps.   Neurological: no headaches, no dizziness, no seizures, no tingling, no numbness, no signs and symptoms of stroke and no limb weakness.   Psychiatric: no confusion, no memory lapses or loss, no depression and no sleep disturbances.   Endocrine:  no excessive thirst, no dry skin, no cold intolerance, no heat intolerance and no increased urinary frequency.   Hematologic/Lymphatic: is not slow to heal, does not bleed easily, does not bruise easily, no thrombophlebitis, no anemia and no  history of blood transfusion.   All other systems have been reviewed and are negative for complaint.     Current Outpatient Medications   Medication Instructions    albuterol 90 mcg/actuation inhaler 1-2 puffs    amLODIPine (NORVASC) 5 mg, oral, Daily    atorvastatin (LIPITOR) 10 mg, oral, Daily    citalopram (CELEXA) 20 mg, oral, Daily    diclofenac sodium (Voltaren) 1 % gel gel PLEASE SEE ATTACHED FOR DETAILED DIRECTIONS    fluticasone (Flonase) 50 mcg/actuation nasal spray 2 sprays, Each Nostril, Daily    metFORMIN (GLUCOPHAGE) 500 mg, oral, 2 times daily (morning and late afternoon)    naproxen (NAPROSYN) 500 mg, 2 times daily PRN    naproxen ER (Naprelan) 375 mg 24 hr tablet 2 tablets, Daily    NON FORMULARY Take by mouth. Vitamin D Tabs       Vitals:    05/23/25 1148   BP: 140/80   Pulse: 68   Resp: 16        Physical Exam     Constitutional   General appearance: Alert and in no acute distress.     Pulmonary   Respiratory assessment: No respiratory distress, normal respiratory rhythm and effort.    Auscultation of Lungs: Clear bilateral breath sounds.   Cardiovascular   Auscultation of heart: Apical pulse normal, heart rate and rhythm normal, normal S1 and S2, no murmurs and no pericardial rub.    Exam for edema: No peripheral edema.   Abdomen   Abdominal Exam: No bruits, normal bowel sounds, soft, non-tender, no abdominal mass palpated.    Liver and Spleen exam: No hepato-splenomegaly.   Musculoskeletal   Examination of gait: Normal.    Inspection of digits and nails: No clubbing or cyanosis of the fingernails.    Inspection/palpation of joints, bones and muscles: No joint swelling. Normal movement of all extremities.   Skin   Skin inspection: Normal skin color and pigmentation, normal skin turgor and no visible rash.   Neurologic   Cranial nerves: Nerves 2-12 were intact, no focal neuro defects.            Assessment/Plan      Problem List Items Addressed This Visit       Uncontrolled type 2 diabetes  mellitus with hyperglycemia, without long-term current use of insulin - Primary    Relevant Orders    POCT Fingerstick Glucose manually resulted (Completed)    POCT glycosylated hemoglobin (Hb A1C) manually resulted (Completed)    HLD (hyperlipidemia)    Hypertension    Obesity, Class III, BMI 40-49.9 (morbid obesity)    Annual physical exam     Other Visit Diagnoses         Major depressive disorder with current active episode, unspecified depression episode severity, unspecified whether recurrent                 Patient with a past medical history of DM, HTN, HLD, Morbid obesity, Breast CA, Depression, Breast carcinoma s/p right mastectomy, Mammogram (Nov), Tubular adenoma (2030)        # HTN  Borderline  Continue Amlodipine  Allergic to ACE/ ARB  Watch salt  Walk 30 minutes daily        # DM/ Morbid Obesity  A1c 7.0  Restart Metformin  GLP too expensive        # HLD  Atorva 10 mg  CT calcium score 0     # ANALI  stable     # Vit D deficiency  Continue Vit D

## 2025-06-18 ENCOUNTER — OFFICE VISIT (OUTPATIENT)
Dept: ORTHOPEDIC SURGERY | Facility: HOSPITAL | Age: 50
End: 2025-06-18
Payer: COMMERCIAL

## 2025-06-18 ENCOUNTER — HOSPITAL ENCOUNTER (OUTPATIENT)
Dept: RADIOLOGY | Facility: HOSPITAL | Age: 50
Discharge: HOME | End: 2025-06-18
Payer: COMMERCIAL

## 2025-06-18 ENCOUNTER — APPOINTMENT (OUTPATIENT)
Dept: ORTHOPEDIC SURGERY | Facility: HOSPITAL | Age: 50
End: 2025-06-18
Payer: COMMERCIAL

## 2025-06-18 DIAGNOSIS — M25.561 RIGHT KNEE PAIN, UNSPECIFIED CHRONICITY: ICD-10-CM

## 2025-06-18 DIAGNOSIS — E66.813 OBESITY, CLASS III, BMI 40-49.9 (MORBID OBESITY): ICD-10-CM

## 2025-06-18 DIAGNOSIS — M17.0 TRICOMPARTMENT OSTEOARTHRITIS OF BOTH KNEES: Primary | ICD-10-CM

## 2025-06-18 PROCEDURE — 99213 OFFICE O/P EST LOW 20 MIN: CPT | Performed by: SPECIALIST/TECHNOLOGIST

## 2025-06-18 PROCEDURE — 73564 X-RAY EXAM KNEE 4 OR MORE: CPT | Mod: RT

## 2025-06-18 PROCEDURE — 3051F HG A1C>EQUAL 7.0%<8.0%: CPT | Performed by: SPECIALIST/TECHNOLOGIST

## 2025-06-18 PROCEDURE — 3049F LDL-C 100-129 MG/DL: CPT | Performed by: SPECIALIST/TECHNOLOGIST

## 2025-06-18 PROCEDURE — 99214 OFFICE O/P EST MOD 30 MIN: CPT | Performed by: SPECIALIST/TECHNOLOGIST

## 2025-06-18 PROCEDURE — 73564 X-RAY EXAM KNEE 4 OR MORE: CPT | Mod: RIGHT SIDE | Performed by: RADIOLOGY

## 2025-06-18 PROCEDURE — 1036F TOBACCO NON-USER: CPT | Performed by: SPECIALIST/TECHNOLOGIST

## 2025-06-18 ASSESSMENT — PAIN - FUNCTIONAL ASSESSMENT: PAIN_FUNCTIONAL_ASSESSMENT: 0-10

## 2025-06-18 ASSESSMENT — PAIN SCALES - GENERAL: PAINLEVEL_OUTOF10: 6

## 2025-06-19 NOTE — PROGRESS NOTES
Chief Complaint: Pain of the Left Knee (DOLI 1/8/25) and Pain of the Right Knee       HPI:  Floresita Terrell is a 50 y.o. female presenting, with her , for bilateral knee pain, left worse than right.  She was previously seen in January 2025 by my colleague Chayo Mckeon provided with a corticosteroid injection.  She states that this did provide her little bit of help.  The left knee she reports continues to buckle she feels the pain over the anterior aspect of her knee.  Pain is worsened with knee extension and direct pressure and going up and down stairs.  She has a history of a left ACL reconstruction in 2017 that had been doing okay.  The right knee has been giving her trouble for a few months she describes it as a sharp pain over the anterior and posterior aspect of her knee.  Pain is also worsened with stairs and extending her knee.  She has done occasionally over-the-counter analgesia and lidocaine patches.  She denies prior right knee injuries.  She denies numbness or tingling to the bilateral lower extremities.  She presents for treatment recommendations.    Objective     ROS:  Constitutional: No fever, no chills, not feeling tired, no recent weight gain and no recent weight loss  ENT: No nosebleeds  Cardiovascular: No chest pain  Respiratory: No shortness of breath and no cough  Gastrointestinal: No abdominal pain, no nausea, no diarrhea, and no vomiting  Musculoskeletal: Positive for bilateral knee pain, left worse than right  Integumentary: No rashes and no skin lesions  Neurological: No headache  Psychiatric: No sleep disturbances no depression  Endocrine: No muscle weakness and no muscle cramps  Hematologic/lymphatic: No swelling glands and no tendency for easy bruising    Medical History[1]     Surgical History[2]     Social Connections: Not on file          Physical Exam:  General appearance: WN, WD female, in no acute distress  Skin: No rashes, lesions or wounds  Head: Normocephalic, no  evidence of trauma  Eye: EOMI, conjunctiva clear, no discharge  ENT: Nares patent  Neck: No abnormal contour, tracheal midline  Chest/lungs: No respiratory distress, speaking in complete sentences  Musculoskeletal: RIGHT KNEE: Tender to palpation over the medial joint line.  Stable valgus and varus stress test at 0 and 30 degrees.  Stable Lachman's.  She is able to form an isometric quadricep contraction and straight leg raise without difficulty or lag no effusion.  LEFT KNEE: Tender to palpation over the lateral joint line.  She is able to perform an isometric quadriceps contraction and straight leg raise without difficulty or lag.  Stable valgus and varus stress test at 0 and 30 degrees.  1a Lachman.  Trace effusion.  She has knee flexion to 115 degrees bilaterally.  2+ bilateral patellofemoral crepitus.  No decreased ROM, muscle wasting, rigidity    Neurological: A&O x3, no focal deficits, intact bilateral LE  Psych: normal affect, mood, appearance      Image Results:  X-rays of the left knee taken in January 2025 were again reviewed with the patient and her  and show no fractures or dislocations.  Moderate patellofemoral osteoarthritis, mild to moderate medial compartment.  X-rays of the right knee taken on 6/18/2025 are reviewed with the patient and show no acute fractures or dislocations.  Moderate patellofemoral osteoarthritis      Assessment/Plan   Encounter Diagnoses:  Tricompartment osteoarthritis of both knees    Right knee pain, unspecified chronicity    Obesity, Class III, BMI 40-49.9 (morbid obesity)    Orders Placed This Encounter    XR knee right 4+ views    Referral to Physical Therapy    Referral to Nutrition Services       The patient and I discussed her clinical presentation and physical exam findings consistent with bilateral knee osteoarthritis.  We discussed her conservative and surgical treatment options.  The only surgical intervention now that could give her any relief of her  symptoms will be a total knee replacement however she is not at that point now.  Therefore we agreed upon continued conservative treatment.  We agreed upon naproxen 500 mg taken twice daily with food for the next 14 days in conjunction with two 500 mg extra strength Tylenol 3 times per day.  We provided her with a referral to physical therapy to demonstrate quadriceps, gluteus and core activities and weight room modifications.  I encouraged her to do low impact activities such as walk, bike, elliptical  and/or swimming pool.  We agreed upon a referral to nutrition to help with her weight loss journey.  She is trying to lose some weight and this is causing her knee pain to increase.  We also discussed a repeat corticosteroid injection into the left knee and a right knee corticosteroid injection which she wishes to defer until later.  She will follow-up in the office as needed for consideration for an intra-articular corticosteroid injection.  They are in agreement the plan.  Their questions are answered.    ** This office note was dictated using Dragon voice to text software and was not proofread for spelling or grammatical errors **         [1]   Past Medical History:  Diagnosis Date    Allergy, unspecified, initial encounter 07/25/2018    Acute allergic reaction    Breast cancer     Candidiasis, unspecified     Yeast infection    Chronic sinusitis, unspecified 01/07/2017    Sinobronchitis    Insect bite (nonvenomous), left lower leg, initial encounter (CODE) 07/25/2018    Insect bite of leg, left    Nausea with vomiting, unspecified 02/15/2018    Nausea and vomiting in adult patient    Otalgia, left ear 02/18/2014    Otalgia of left ear    Other acute postprocedural pain 03/23/2020    Post-operative pain    Other complications of procedures, not elsewhere classified, subsequent encounter 12/12/2018    Non-healing surgical wound, subsequent encounter    Other conditions influencing health status 03/07/2016     History of cough    Other specified diseases of intestine 06/01/2016    Small bowel mass    Other tear of medial meniscus, current injury, unspecified knee, initial encounter 09/28/2015    Medial meniscus tear    Otitis media, unspecified, left ear 03/07/2016    Acute left otitis media    Pain in left ankle and joints of left foot 04/25/2018    Left ankle pain, unspecified chronicity    Pain in left knee 05/14/2018    Left knee pain    Pain in left lower leg 09/05/2018    Pain of left lower leg    Personal history of diseases of the skin and subcutaneous tissue 06/23/2016    History of contact dermatitis    Personal history of other (healed) physical injury and trauma 05/15/2018    History of sprain of ankle    Personal history of other diseases of the female genital tract 07/24/2018    History of abnormal uterine bleeding    Personal history of other diseases of the respiratory system 08/13/2019    History of acute sinusitis    Personal history of other diseases of the respiratory system 07/27/2020    History of acute pharyngitis    Personal history of other infectious and parasitic diseases 02/15/2018    History of viral gastroenteritis    Personal history of other medical treatment 07/24/2018    History of screening mammography    Personal history of other specified conditions 09/10/2018    History of urinary frequency    Personal history of other specified conditions 04/29/2015    History of breast lump    Personal history of other specified conditions 04/07/2017    History of abdominal pain    Personal history of peptic ulcer disease     Personal history of gastric ulcer    Sprain of deltoid ligament of left ankle, initial encounter 04/26/2018    Sprain of deltoid ligament of left ankle, initial encounter    Sprain of unspecified ligament of left ankle, subsequent encounter 05/15/2018    Sprain of left ankle, unspecified ligament, subsequent encounter    Unspecified injury of unspecified foot, initial encounter  01/23/2020    Toe injury    Ventricular premature depolarization 03/20/2015    PVC (premature ventricular contraction)   [2]   Past Surgical History:  Procedure Laterality Date    BACK SURGERY  04/29/2015    Back Surgery    BI MAMMO GUIDED BREAST RIGHT LOCALIZATION Right 10/12/2018    BI MAMMO GUIDED LOCALIZATION BREAST RIGHT 10/12/2018 [facility]    BREAST BIOPSY      BREAST SURGERY  04/29/2015    Breast Surgery    MASTECTOMY      OTHER SURGICAL HISTORY  06/22/2020    Knee surgery    OTHER SURGICAL HISTORY  08/29/2016    Lysis Of Peritoneal Adhesions Laparoscopic    OTHER SURGICAL HISTORY  08/29/2016    Laparoscopic Excision Pelvic Peritoneum Mass (___ Cm)    TUBAL LIGATION  04/29/2015    Tubal Ligation

## 2025-07-28 DIAGNOSIS — F32.9 MAJOR DEPRESSIVE DISORDER WITH CURRENT ACTIVE EPISODE, UNSPECIFIED DEPRESSION EPISODE SEVERITY, UNSPECIFIED WHETHER RECURRENT: Primary | ICD-10-CM

## 2025-07-28 RX ORDER — CITALOPRAM 20 MG/1
20 TABLET ORAL DAILY
Qty: 30 TABLET | Refills: 0 | Status: SHIPPED | OUTPATIENT
Start: 2025-07-28 | End: 2025-09-26

## 2025-08-06 ENCOUNTER — OFFICE VISIT (OUTPATIENT)
Dept: OBSTETRICS AND GYNECOLOGY | Facility: CLINIC | Age: 50
End: 2025-08-06
Payer: COMMERCIAL

## 2025-08-06 VITALS
BODY MASS INDEX: 43.65 KG/M2 | HEIGHT: 68 IN | WEIGHT: 288 LBS | SYSTOLIC BLOOD PRESSURE: 128 MMHG | HEART RATE: 80 BPM | DIASTOLIC BLOOD PRESSURE: 86 MMHG

## 2025-08-06 DIAGNOSIS — R21 GROIN RASH: ICD-10-CM

## 2025-08-06 DIAGNOSIS — N95.2 VAGINAL ATROPHY: Primary | ICD-10-CM

## 2025-08-06 DIAGNOSIS — N89.8 VAGINAL IRRITATION: ICD-10-CM

## 2025-08-06 PROCEDURE — 3051F HG A1C>EQUAL 7.0%<8.0%: CPT

## 2025-08-06 PROCEDURE — 99211 OFF/OP EST MAY X REQ PHY/QHP: CPT

## 2025-08-06 PROCEDURE — 99213 OFFICE O/P EST LOW 20 MIN: CPT

## 2025-08-06 PROCEDURE — 3008F BODY MASS INDEX DOCD: CPT

## 2025-08-06 PROCEDURE — 99214 OFFICE O/P EST MOD 30 MIN: CPT

## 2025-08-06 PROCEDURE — 3074F SYST BP LT 130 MM HG: CPT

## 2025-08-06 PROCEDURE — 3079F DIAST BP 80-89 MM HG: CPT

## 2025-08-06 RX ORDER — NYSTATIN AND TRIAMCINOLONE ACETONIDE 100000; 1 [USP'U]/G; MG/G
OINTMENT TOPICAL 2 TIMES DAILY
Qty: 15 G | Refills: 1 | Status: SHIPPED | OUTPATIENT
Start: 2025-08-06

## 2025-08-06 RX ORDER — ESTRADIOL 0.1 MG/G
2 CREAM VAGINAL NIGHTLY
Qty: 42.5 G | Refills: 2 | Status: SHIPPED | OUTPATIENT
Start: 2025-08-06 | End: 2026-08-06

## 2025-08-06 ASSESSMENT — ENCOUNTER SYMPTOMS
GASTROINTESTINAL NEGATIVE: 0
PSYCHIATRIC NEGATIVE: 0
EYES NEGATIVE: 0
CARDIOVASCULAR NEGATIVE: 0
CONSTITUTIONAL NEGATIVE: 0
ALLERGIC/IMMUNOLOGIC NEGATIVE: 0
NEUROLOGICAL NEGATIVE: 0
RESPIRATORY NEGATIVE: 0
HEMATOLOGIC/LYMPHATIC NEGATIVE: 0
ENDOCRINE NEGATIVE: 0
MUSCULOSKELETAL NEGATIVE: 0

## 2025-08-06 ASSESSMENT — PAIN SCALES - GENERAL: PAINLEVEL_OUTOF10: 0-NO PAIN

## 2025-08-06 NOTE — PROGRESS NOTES
Subjective   Patient ID: Floresita Terrell is a 50 y.o. female who presents for vaginal concerns (Pap 1/2023 wnl, hpv neg/MAMMO 11/2024).    50-year-old female presenting today with complaints of bilateral groin irritation.  States that she has constant itching in the groin area, has gotten worse over the last couple of months.  Also notes that her  is being treated with topical medication for yeast around the tip of the penis, she would like vaginal testing today.     Patient also complaining of vaginal dryness, has some discomfort with intercourse. Using water based lubrication, sometimes helpful. Has considered vaginal moisturizers but has not tried yet.     Last pap 2023- neg/neg       Review of Systems   All other systems reviewed and are negative.      Objective   Physical Exam  Vitals and nursing note reviewed.   Abdominal:      Comments: Bilateral groin area with rash/irritation    Genitourinary:     General: Normal vulva.      Exam position: Lithotomy position.      Vagina: Normal.      Cervix: Normal.      Comments: Vaginal atrophy         Assessment/Plan   Problem List Items Addressed This Visit    None  Visit Diagnoses         Codes      Vaginal atrophy    -  Primary N95.2    Relevant Medications    estradiol (Estrace) 0.01 % (0.1 mg/gram) vaginal cream      Groin rash     R21    Relevant Medications    nystatin-triamcinolone (Mycolog II) ointment      Vaginal irritation     N89.8    Relevant Orders    Vaginitis Gram Stain For Bacterial Vaginosis + Yeast    C. trachomatis / N. gonorrhoeae, Amplified, Urogenital    Trichomonas vaginalis, Amplified          Patient to follow up with routine GYN provider for annual exams, educated on keeping groin clean and dry, make sure area is dry before getting dressed after bathing.       Chitra Boyd, EZEQUIEL-CNP 08/06/25 10:55 AM

## 2025-08-07 DIAGNOSIS — B96.89 BACTERIAL VAGINOSIS: Primary | ICD-10-CM

## 2025-08-07 DIAGNOSIS — N76.0 BACTERIAL VAGINOSIS: Primary | ICD-10-CM

## 2025-08-07 DIAGNOSIS — B37.31 YEAST VAGINITIS: ICD-10-CM

## 2025-08-07 LAB
BV SCORE VAG QL: ABNORMAL
C TRACH RRNA SPEC QL NAA+PROBE: NOT DETECTED
N GONORRHOEA RRNA SPEC QL NAA+PROBE: NOT DETECTED
QUEST GC CT AMPLIFIED (ALWAYS MESSAGE): NORMAL
T VAGINALIS RRNA SPEC QL NAA+PROBE: NOT DETECTED

## 2025-08-07 RX ORDER — METRONIDAZOLE 500 MG/1
500 TABLET ORAL 2 TIMES DAILY
Qty: 14 TABLET | Refills: 0 | Status: SHIPPED | OUTPATIENT
Start: 2025-08-07 | End: 2025-08-14

## 2025-08-07 RX ORDER — FLUCONAZOLE 150 MG/1
150 TABLET ORAL ONCE
Qty: 1 TABLET | Refills: 0 | Status: SHIPPED | OUTPATIENT
Start: 2025-08-07 | End: 2025-08-07

## 2025-08-28 ENCOUNTER — APPOINTMENT (OUTPATIENT)
Dept: PRIMARY CARE | Facility: CLINIC | Age: 50
End: 2025-08-28
Payer: COMMERCIAL

## 2025-09-04 DIAGNOSIS — F32.9 MAJOR DEPRESSIVE DISORDER WITH CURRENT ACTIVE EPISODE, UNSPECIFIED DEPRESSION EPISODE SEVERITY, UNSPECIFIED WHETHER RECURRENT: ICD-10-CM

## 2025-09-05 DIAGNOSIS — N76.0 BACTERIAL VAGINOSIS: ICD-10-CM

## 2025-09-05 DIAGNOSIS — B96.89 BACTERIAL VAGINOSIS: ICD-10-CM

## 2025-09-05 RX ORDER — CITALOPRAM 20 MG/1
20 TABLET ORAL DAILY
Qty: 30 TABLET | Refills: 0 | Status: SHIPPED | OUTPATIENT
Start: 2025-09-05

## 2025-09-05 RX ORDER — METRONIDAZOLE 500 MG/1
500 TABLET ORAL 2 TIMES DAILY
Qty: 14 TABLET | Refills: 0 | OUTPATIENT
Start: 2025-09-05 | End: 2025-09-12

## 2025-09-11 ENCOUNTER — APPOINTMENT (OUTPATIENT)
Dept: PRIMARY CARE | Facility: CLINIC | Age: 50
End: 2025-09-11
Payer: COMMERCIAL